# Patient Record
Sex: FEMALE | Race: BLACK OR AFRICAN AMERICAN
[De-identification: names, ages, dates, MRNs, and addresses within clinical notes are randomized per-mention and may not be internally consistent; named-entity substitution may affect disease eponyms.]

---

## 2017-06-16 ENCOUNTER — HOSPITAL ENCOUNTER (INPATIENT)
Dept: HOSPITAL 74 - YASAS | Age: 62
LOS: 4 days | Discharge: OTHER INSTITUTION | DRG: 897 | End: 2017-06-20
Attending: INTERNAL MEDICINE | Admitting: INTERNAL MEDICINE
Payer: COMMERCIAL

## 2017-06-16 VITALS — BODY MASS INDEX: 14.6 KG/M2

## 2017-06-16 DIAGNOSIS — Z90.49: ICD-10-CM

## 2017-06-16 DIAGNOSIS — J42: ICD-10-CM

## 2017-06-16 DIAGNOSIS — R26.2: ICD-10-CM

## 2017-06-16 DIAGNOSIS — F17.210: ICD-10-CM

## 2017-06-16 DIAGNOSIS — J45.22: ICD-10-CM

## 2017-06-16 DIAGNOSIS — R64: ICD-10-CM

## 2017-06-16 DIAGNOSIS — R00.0: ICD-10-CM

## 2017-06-16 DIAGNOSIS — M14.60: ICD-10-CM

## 2017-06-16 DIAGNOSIS — F32.9: ICD-10-CM

## 2017-06-16 DIAGNOSIS — Z99.89: ICD-10-CM

## 2017-06-16 DIAGNOSIS — R74.0: ICD-10-CM

## 2017-06-16 DIAGNOSIS — K21.9: ICD-10-CM

## 2017-06-16 DIAGNOSIS — E87.6: ICD-10-CM

## 2017-06-16 DIAGNOSIS — Z87.898: ICD-10-CM

## 2017-06-16 DIAGNOSIS — F10.230: Primary | ICD-10-CM

## 2017-06-16 DIAGNOSIS — G40.909: ICD-10-CM

## 2017-06-16 LAB
APPEARANCE UR: CLEAR
BILIRUB UR STRIP.AUTO-MCNC: NEGATIVE MG/DL
COLOR UR: (no result)
KETONES UR QL STRIP: NEGATIVE
LEUKOCYTE ESTERASE UR QL STRIP.AUTO: NEGATIVE
NITRITE UR QL STRIP: NEGATIVE
PH UR: 5 [PH] (ref 5–8)
PROT UR QL STRIP: NEGATIVE
PROT UR QL STRIP: NEGATIVE
RBC # UR STRIP: NEGATIVE /UL
UROBILINOGEN UR STRIP-MCNC: NEGATIVE E.U./DL (ref 0.2–1)

## 2017-06-16 PROCEDURE — HZ2ZZZZ DETOXIFICATION SERVICES FOR SUBSTANCE ABUSE TREATMENT: ICD-10-PCS | Performed by: INTERNAL MEDICINE

## 2017-06-16 RX ADMIN — GABAPENTIN SCH MG: 100 CAPSULE ORAL at 21:28

## 2017-06-16 RX ADMIN — LEVETIRACETAM SCH MG: 500 TABLET, FILM COATED ORAL at 21:28

## 2017-06-16 RX ADMIN — BUDESONIDE AND FORMOTEROL FUMARATE DIHYDRATE SCH: 80; 4.5 AEROSOL RESPIRATORY (INHALATION) at 21:29

## 2017-06-16 RX ADMIN — Medication SCH MG: at 21:29

## 2017-06-16 NOTE — HP
CIWA Score





- CIWA Score


Nausea/Vomitin


Muscle Tremors: 5


Anxiety: 4-Mod. Anxious/Guarded


Agitation: 4-Moderately Restless


Paroxysmal Sweats: 1-Minimal Palms Moist


Orientation: 1-Uncertain about Date


Tacttile Disturbances: 0-None


Auditory Disturbances: 0-None


Visual Disturbances: 0-None


Headache: 1-Very Mild


CIWA-Ar Total Score: 18





Admission ROS [facility]





- HPI


Chief Complaint: 


WITHDRAWAL SX


Allergies/Adverse Reactions: 


 Allergies











Allergy/AdvReac Type Severity Reaction Status Date / Time


 


Penicillins Allergy Severe Hives Verified 16 15:43











History of Present Illness: 


61 YEARS OLD FEMALE WITH LONG HISTORY OF ALCOHOL NICOTINE DEPENDENCE, HAS GERD, 

SEIZURE NEUROPATHY COPE AMBULATE WITH CANE, ALLERGIC SEASONAL AND DEPRESSION IS 

ADMITTED TO DETOX


Exam Limitations: No Limitations





- Ebola screening


Have you traveled outside of the country in the last 21 days: No


Have you had contact with anyone from an Ebola affected area: No


Have you been sick,other than usual withdrawal symptoms: No


Do you have a fever: No





- Review of Systems


Constitutional: Chills, Loss of Appetite, Changes in sleep, Unintentional Wgt. 

Loss, Unexplained wgt Loss


EENT: reports: Other (EYE GLASSES)


Respiratory: reports: SOB with Exertion, Productive cough (WHITISH)


Cardiac: reports: No Symptoms Reported


GI: reports: Nausea, Poor Appetite, Poor Fluid Intake, Vomiting, Indigestion, 

Abdominal cramping


: reports: No Symptoms Reported


Musculoskeletal: reports: Muscle Weakness (LEGS)


Integumentary: reports: No Symptoms Reported


Neuro: reports: Seizure, Tremors


Endocrine: reports: No Symptoms Reported


Hematology: reports: No Symptoms Reported


Psychiatric: reports: Judgement Intact, Depressed


Other Systems: Reviewed and Negative





Patient History





- Patient Medical History


Hx Anemia: Yes (long time ago, no treatment)


Hx Asthma: Yes (Pt is on MDI)


Hx Chronic Obstructive Pulmonary Disease (COPD): Yes


Hx Cancer: No


Hx Cardiac Disorders: No


Hx Congestive Heart Failure: No


Hx Hypertension: No


Hx Hypercholesterolemia: No


Hx Pacemaker: No


HX Cerebrovascular Accident: No


Hx Seizures: Yes (etoh related seizures last in )


Hx Dementia: No


Hx Diabetes: No


Hx Gastrointestinal Disorders: Yes (GERD)


Hx Liver Disease: No


Hx Genitourinary Disorders: No


Hx Sexually Transmitted Disorders: No


Hx Renal Disease (ESRD): No


Hx Thyroid Disease: No


Hx Human Immunodeficiency Virus (HIV): No (NEGATIVE HX)


Hx Hepatitis C: No


Hx Depression: Yes (ZOLOFT)


Hx Suicide Attempt: No


Hx Bipolar Disorder: No


Hx Schizophrenia: No





- Patient Surgical History


Past Surgical History: Yes


Hx Neurologic Surgery: No


Hx Cataract Extraction: No


Hx Cardiac Surgery: No


Hx Lung Surgery: No


Hx Breast Surgery: No


Hx Breast Biopsy: No


Hx Abdominal Surgery: No


Hx Appendectomy: Yes ()


Hx Cholecystectomy: Yes (long time ago)


Hx Genitourinary Surgery: No


Hx  Section: No


Hx Orthopedic Surgery: Yes (Right big toe x )


Hx Hysterectomy: No


Other Surgical History: Gallbladder removed


Anesthesia Reaction: No





- PPD History


Previous Implant?: Yes


Documented Results: Negative w/proof


Implanted On Prior [facility] Admission?: Yes


Date: 16


Results: 0 mm


PPD to be Administered?: No





- Reproductive History


Patient is a Female of Child Bearing Age (11 -55 yrs old): No


Last Menstrual Period: 10/17/00


Patient Pregnant: No





- Smoking Cessation


Smoking history: Current every day smoker


Have you smoked in the past 12 months: Yes


Aproximately how many cigarettes per day: 20


Cigars Per Day: 0


Hx Chewing Tobacco Use: No


Initiated information on smoking cessation: Yes


'Breaking Loose' booklet given: 17





- Substance & Tx. History


Hx Alcohol Use: Yes


Hx Substance Use: No


Substance Use Type: Alcohol


Hx Substance Use Treatment: Yes (-16 [place])





- Substances Abused


  ** Alcohol


Route: Oral


Frequency: Daily


Amount used: [id number]


Age of first use: 12


Date of Last Use: 17





Family Disease History





- Family Disease History


Family Disease History: Diabetes: Father, Sister, [place]: Mother (brain /alzheimers 

)





Admission Physical Exam [facility]





- Vital Signs


Vital Signs: 


 Vital Signs - 24 hr











  17





  15:55


 


Temperature 97.1 F L


 


Pulse Rate 98 H


 


Respiratory 16





Rate 


 


Blood Pressure 130/70














- Physical


General Appearance: Yes: Appropriately Dressed, Moderate Distress, Alcohol on 

Breath, Thin, Tremorous, Irritable, Sweating, Anxious


HEENTM: Yes: Hearing grossly Normal, Normal ENT Inspection, Normocephalic, 

Normal Voice


Respiratory: Yes: Chest Non-Tender, No Respiratory Distress, No Accessory 

Muscle Use, Hyperresonant, Inspiration


Neck: Yes: Supple, Trachea in good position


Breast: Yes: Breasts Symetrical


Cardiology: Yes: Regular Rhythm, S1, S2, Tachycardia


Abdominal: Yes: Non Tender, Soft


Genitourinary: Yes: Within Normal Limits


Back: Yes: Normal Inspection


Musculoskeletal: Yes: Gait Steady (CANE), Muscle weakness (LEGS)


Extremities: Yes: Non-Tender, Tremors, Other (WEAKNESS OF BOTH LEGS)


Neurological: Yes: Alert, Normal Response, Depressed Affect


Integumentary: Yes: Warm


Lymphatic: Yes: Within Normal Limits





- Diagnostic


(1) Alcohol dependence with uncomplicated withdrawal


Current Visit: Yes   Status: Acute





(2) COPD (chronic obstructive pulmonary disease)


Current Visit: Yes   Status: Chronic   Qualifiers: 


     COPD type: chronic bronchitis     Chronic bronchitis type: unspecified     

Qualified Code(s): J42 - Unspecified chronic bronchitis  


Comment: .








(3) GERD (gastroesophageal reflux disease)


Current Visit: Yes   Status: Chronic   Qualifiers: 


     Esophagitis presence: esophagitis presence not specified     Qualified Code

(s): K21.9 - Gastro-esophageal reflux disease without esophagitis  


Comment: .








(4) Nicotine dependence


Current Visit: Yes   Status: Acute   Qualifiers: 


     Nicotine product type: cigarettes     Substance use status: in withdrawal 

       Qualified Code(s): F17.213 - Nicotine dependence, cigarettes, with 

withdrawal  


Comment: .








(5) Seizures


Current Visit: Yes   Status: Chronic   Qualifiers: 


     Convulsion type: unspecified     Qualified Code(s): R56.9 - Unspecified 

convulsions  


Comment: .








(6) Neuropathic arthritis


Current Visit: Yes   Status: Chronic





(7) Weight loss


Current Visit: Yes   Status: Acute





(8) Depression


Current Visit: Yes   Status: Suspected   Qualifiers: 


     Depression Type: dysthymia        Qualified Code(s): F34.1 - Dysthymic 

disorder  





(9) Use of cane as ambulatory aid


Current Visit: Yes   Status: Chronic








Cleared for Admission S





- Detox or Rehab


[facility] Level of Care: Medically Managed


Detox Regimen/Protocol: Librium





BHS Breath Alcohol Content


Breath Alcohol Content: 0.204





Urine Pregancy Test





- Result


Urine Pregnancy Test Results: Negative- NO Line Present





Urine Drug Screen





- Results


Drug Screen Negative: Yes

## 2017-06-17 LAB
ALBUMIN SERPL-MCNC: 4.2 G/DL (ref 3.4–5)
ALP SERPL-CCNC: 88 U/L (ref 45–117)
ALT SERPL-CCNC: 45 U/L (ref 12–78)
ANION GAP SERPL CALC-SCNC: 17 MMOL/L (ref 8–16)
AST SERPL-CCNC: 108 U/L (ref 15–37)
BILIRUB SERPL-MCNC: 0.4 MG/DL (ref 0.2–1)
CALCIUM SERPL-MCNC: 9.2 MG/DL (ref 8.5–10.1)
CO2 SERPL-SCNC: 22 MMOL/L (ref 21–32)
CREAT SERPL-MCNC: 0.6 MG/DL (ref 0.55–1.02)
DEPRECATED RDW RBC AUTO: 13.4 % (ref 11.6–15.6)
GLUCOSE SERPL-MCNC: 86 MG/DL (ref 74–106)
MCH RBC QN AUTO: 31.6 PG (ref 25.7–33.7)
MCHC RBC AUTO-ENTMCNC: 33.9 G/DL (ref 32–36)
MCV RBC: 93.3 FL (ref 80–96)
PLATELET # BLD AUTO: 197 K/MM3 (ref 134–434)
PMV BLD: 9.3 FL (ref 7.5–11.1)
PROT SERPL-MCNC: 8.5 G/DL (ref 6.4–8.2)
SP GR UR: < 1.005 (ref 1–1.02)
WBC # BLD AUTO: 4.5 K/MM3 (ref 4–10)

## 2017-06-17 RX ADMIN — Medication SCH MG: at 22:58

## 2017-06-17 RX ADMIN — BUDESONIDE AND FORMOTEROL FUMARATE DIHYDRATE SCH INH: 80; 4.5 AEROSOL RESPIRATORY (INHALATION) at 10:51

## 2017-06-17 RX ADMIN — LEVETIRACETAM SCH MG: 500 TABLET, FILM COATED ORAL at 10:52

## 2017-06-17 RX ADMIN — GABAPENTIN SCH MG: 100 CAPSULE ORAL at 06:46

## 2017-06-17 RX ADMIN — MONTELUKAST SODIUM SCH MG: 10 TABLET, COATED ORAL at 22:58

## 2017-06-17 RX ADMIN — NICOTINE SCH: 21 PATCH TRANSDERMAL at 10:53

## 2017-06-17 RX ADMIN — PANTOPRAZOLE SODIUM SCH MG: 20 TABLET, DELAYED RELEASE ORAL at 10:51

## 2017-06-17 RX ADMIN — Medication SCH TAB: at 10:52

## 2017-06-17 RX ADMIN — POTASSIUM CHLORIDE SCH MEQ: 1500 TABLET, EXTENDED RELEASE ORAL at 22:57

## 2017-06-17 RX ADMIN — GABAPENTIN SCH MG: 100 CAPSULE ORAL at 14:30

## 2017-06-17 RX ADMIN — GABAPENTIN SCH: 100 CAPSULE ORAL at 22:58

## 2017-06-17 RX ADMIN — BUDESONIDE AND FORMOTEROL FUMARATE DIHYDRATE SCH: 80; 4.5 AEROSOL RESPIRATORY (INHALATION) at 22:58

## 2017-06-17 RX ADMIN — GUAIFENESIN AND DEXTROMETHORPHAN PRN ML: 100; 10 SYRUP ORAL at 06:50

## 2017-06-17 RX ADMIN — LEVETIRACETAM SCH MG: 500 TABLET, FILM COATED ORAL at 22:57

## 2017-06-17 NOTE — EKG
Test Reason : 

Blood Pressure : ***/*** mmHG

Vent. Rate : 091 BPM     Atrial Rate : 091 BPM

   P-R Int : 170 ms          QRS Dur : 090 ms

    QT Int : 388 ms       P-R-T Axes : 072 058 073 degrees

   QTc Int : 477 ms

 

NORMAL SINUS RHYTHM

POSSIBLE LEFT ATRIAL ENLARGEMENT

SEPTAL INFARCT , AGE UNDETERMINED

ABNORMAL ECG

NO PREVIOUS ECGS AVAILABLE

Confirmed by CARLOS CANAS MD ([id number]) on 6/17/2017 9:58:25 AM

 

Referred By: Mehdi Eric           Confirmed By:CARLOS CANAS MD

## 2017-06-17 NOTE — CONSULT
[facility] Psychiatric Consult





- Data


Date of interview: 06/17/17


Admission source: [facility]


Identifying data: Readmission to [facility] for this 60 y/o AA female seeking 

detox treatment for alcohol dependence.Patient is a ,mother of two,

domiciled,unemployed and supported on SSI and 's benefits..


Substance Abuse History: - Smoking Cessation.  Smoking history: Current every 

day smoker.  Have you smoked in the past 12 months: Yes.  Aproximately how many 

cigarettes per day: 20.  Cigars Per Day: 0.  Hx Chewing Tobacco Use: No.  

Initiated information on smoking cessation: Yes.  'Breaking Loose' booklet given

: 06/16/17.  - Substance & Tx. History.  Hx Alcohol Use: Yes.  Hx Substance Use

: No.  Substance Use Type: Alcohol.  Hx Substance Use Treatment: Yes (11/17-11/ 21/16 [place]).  - Substances Abused.  ** Alcohol.  Route: Oral.  Frequency: 

Daily.  Amount used: [id number].  Age of first use: 12.  Date of Last Use: 06/16 /17.  Confirmed by patient.


Medical History: Significant for weight loss,anemia,COPD,bronchial asthma,

spinal stenosis,low back pain,GERD,seizure disorder (on keppra) and a history 

of cholecystectomy.Noted history of orthosurgery for fracture of right big toe.


Psychiatric History: Patient denies history of psychiatric hospitalizations.No 

OPD care.Ms Song is currently prescribed zoloft 25 mg/day by her primary care 

doctor (started this month).Patient admits to experiencing periods of dyphoric/

depressed mood but she denies suicidal ideation.No history of suicide 

attempts.Reported decent sleep.


Physical/Sexual Abuse/Trauma History: Patient denies history of abuse.


Additional Comment: Drug Screen is negative.





Mental Status Exam





- Mental Status Exam


Alert and Oriented to: Time, Place, Person


Cognitive Function: Good


Patient Appearance: Well Groomed (thin,frail habitus,short stature)


Mood: Sad, Withdrawn


Affect: Constricted


Patient Behavior: Fatigued, Cooperative


Speech Pattern: Clear


Voice Loudness: Moderately Soft/Quiet


Thought Process: Intact, Goal Oriented


Thought Disorder: Not Present


Hallucinations: Denies


Suicidal Ideation: Denies


Homicidal Ideation: Denies


Insight/Judgement: Poor


Sleep: Poorly, Difficulty falling asleep


Appetite: Weight loss


Muscle strength/Tone: Normal


Gait/Station: Other (walks with a cane)





Psychiatric Findings





- Problem List (Axis 1, 2,3)


(1) Alcohol dependence with uncomplicated withdrawal


Current Visit: Yes   Status: Acute





(2) Nicotine dependence


Current Visit: Yes   Status: Acute   Qualifiers: 


     Nicotine product type: cigarettes     Substance use status: in withdrawal 

       Qualified Code(s): F17.213 - Nicotine dependence, cigarettes, with 

withdrawal  


Comment: .








(3) Depressive disorder


Current Visit: Yes   Status: Chronic





(4) Weight loss


Current Visit: Yes   Status: Chronic





(5) COPD (chronic obstructive pulmonary disease)


Current Visit: Yes   Status: Chronic   Qualifiers: 


     COPD type: chronic bronchitis     Chronic bronchitis type: unspecified     

Qualified Code(s): J42 - Unspecified chronic bronchitis  


Comment: .








(6) GERD (gastroesophageal reflux disease)


Current Visit: Yes   Status: Chronic   Qualifiers: 


     Esophagitis presence: esophagitis presence not specified        Qualified 

Code(s): K21.9 - Gastro-esophageal reflux disease without esophagitis  


Comment: .








(7) Neuropathic arthritis


Current Visit: Yes   Status: Chronic





(8) Seizures


Current Visit: Yes   Status: Chronic   Qualifiers: 


     Convulsion type: unspecified     Qualified Code(s): R56.9 - Unspecified 

convulsions  


Comment: .








(9) Anemia


Current Visit: Yes   Status: Chronic   


Comment: .








(10) Asthma


Current Visit: No   Status: Chronic   Qualifiers: 


     Asthma severity: mild intermittent     Asthma complication type: with 

status asthmaticus        Qualified Code(s): J45.22 - Mild intermittent asthma 

with status asthmaticus  


Comment: .








(11) Cachexia


Current Visit: No   Status: Chronic








- Initial Treatment Plan


Initial Treatment Plan: Psychoeducation.Detoxification.Zoloft 25 mg po 

daily.Side effects/benefits discussed with the patient.She agrees with this 

careplan.Observation.NO scripts at discharge (issued on 6/4/17 at [facility]).

## 2017-06-17 NOTE — PN
S CIWA





- CIWA Score


Nausea/Vomitin-No Nausea/No Vomiting


Muscle Tremors: 4-Moderate,w/Arms Extend


Anxiety: 4-Mod. Anxious/Guarded


Agitation: 4-Moderately Restless


Paroxysmal Sweats: 3


Orientation: 0-Oriented


Tacttile Disturbances: 0-None


Auditory Disturbances: 0-None


Visual Disturbances: 0-None


Headache: 0-None Present


CIWA-Ar Total Score: 15





BHS Progress Note (SOAP)


Subjective: 


Anxiety,tremors,sweating,interrupted sleep,restless.


Objective: 





17 15:46


 Vital Signs - 8 hr











  17





  10:00 15:26


 


Temperature 97.9 F 97.9 F


 


Pulse Rate 90 96 H


 


Respiratory 20 18





Rate  


 


Blood Pressure 129/73 134/77








 Laboratory Tests











  17





  21:16 08:00 08:00


 


WBC   4.5 


 


RBC   3.99 


 


Hgb   12.6 


 


Hct   37.2 


 


MCV   93.3 


 


MCHC   33.9 


 


RDW   13.4 


 


Plt Count   197  D 


 


MPV   9.3 


 


Sodium    133 L


 


Potassium    3.3 L


 


Chloride    94 L


 


Carbon Dioxide    22


 


Anion Gap    17 H


 


BUN    4 L D


 


Creatinine    0.6


 


Creat Clearance w eGFR    > 60


 


Random Glucose    86


 


Calcium    9.2


 


Total Bilirubin    0.4  D


 


AST    108 H


 


ALT    45  D


 


Alkaline Phosphatase    88


 


Total Protein    8.5 H


 


Albumin    4.2


 


Urine Color  Straw  


 


Urine Appearance  Clear  


 


Urine pH  5.0  


 


Ur Specific Gravity  < 1.005 L  


 


Urine Protein  Negative  


 


Urine Glucose (UA)  Negative  


 


Urine Ketones  Negative  


 


Urine Blood  Negative  


 


Urine Nitrite  Negative  


 


Urine Bilirubin  Negative  


 


Urine Urobilinogen  Negative  


 


Ur Leukocyte Esterase  Negative  








labs noted,K+ 3.3,k-dur ordered 


Assessment: 





17 15:47


Withdrawal sx.


Plan: 


Continue detox

## 2017-06-18 RX ADMIN — BUDESONIDE AND FORMOTEROL FUMARATE DIHYDRATE SCH INH: 80; 4.5 AEROSOL RESPIRATORY (INHALATION) at 10:40

## 2017-06-18 RX ADMIN — MONTELUKAST SODIUM SCH MG: 10 TABLET, COATED ORAL at 22:46

## 2017-06-18 RX ADMIN — Medication SCH MG: at 22:45

## 2017-06-18 RX ADMIN — POTASSIUM CHLORIDE SCH MEQ: 1500 TABLET, EXTENDED RELEASE ORAL at 10:39

## 2017-06-18 RX ADMIN — LEVETIRACETAM SCH MG: 500 TABLET, FILM COATED ORAL at 22:45

## 2017-06-18 RX ADMIN — BUDESONIDE AND FORMOTEROL FUMARATE DIHYDRATE SCH: 80; 4.5 AEROSOL RESPIRATORY (INHALATION) at 23:07

## 2017-06-18 RX ADMIN — NICOTINE SCH: 21 PATCH TRANSDERMAL at 10:39

## 2017-06-18 RX ADMIN — PANTOPRAZOLE SODIUM SCH MG: 20 TABLET, DELAYED RELEASE ORAL at 10:39

## 2017-06-18 RX ADMIN — Medication SCH TAB: at 10:39

## 2017-06-18 RX ADMIN — GABAPENTIN SCH MG: 100 CAPSULE ORAL at 06:36

## 2017-06-18 RX ADMIN — GUAIFENESIN AND DEXTROMETHORPHAN PRN ML: 100; 10 SYRUP ORAL at 23:16

## 2017-06-18 RX ADMIN — LEVETIRACETAM SCH MG: 500 TABLET, FILM COATED ORAL at 10:39

## 2017-06-18 RX ADMIN — GABAPENTIN SCH: 100 CAPSULE ORAL at 22:46

## 2017-06-18 RX ADMIN — POTASSIUM CHLORIDE SCH MEQ: 1500 TABLET, EXTENDED RELEASE ORAL at 22:46

## 2017-06-18 RX ADMIN — GABAPENTIN SCH: 100 CAPSULE ORAL at 13:30

## 2017-06-18 NOTE — PN
[facility] CIWA





- CIWA Score


Nausea/Vomitin-Mild Nausea/No Vomiting


Muscle Tremors: 5


Anxiety: 4-Mod. Anxious/Guarded


Agitation: 4-Moderately Restless


Paroxysmal Sweats: 3


Orientation: 0-Oriented


Tacttile Disturbances: 1-Very Mild Itch/Numbness


Auditory Disturbances: 0-None


Visual Disturbances: 0-None


Headache: 0-None Present


CIWA-Ar Total Score: 18





BHS Progress Note (SOAP)


Subjective: 


Anxiety,tremors,sweating,interrupted sleep,restless


Objective: 





17 10:18


 Vital Signs - 8 hr











  17





  03:30 06:52


 


Temperature  97.9 F


 


Pulse Rate  83


 


Respiratory 18 18





Rate  


 


Blood Pressure  141/86








 Laboratory Last Values











WBC  4.5 K/mm3 (4.0-10.0)   17  08:00    


 


RBC  3.99 M/mm3 (3.60-5.2)   17  08:00    


 


Hgb  12.6 GM/dL (10.7-15.3)   17  08:00    


 


Hct  37.2 % (32.4-45.2)   17  08:00    


 


MCV  93.3 fl (80-96)   17  08:00    


 


MCHC  33.9 g/dl (32.0-36.0)   17  08:00    


 


RDW  13.4 % (11.6-15.6)   17  08:00    


 


Plt Count  197 K/MM3 (134-434)  D 17  08:00    


 


MPV  9.3 fl (7.5-11.1)   17  08:00    


 


Sodium  133 mmol/L (136-145)  L  17  08:00    


 


Potassium  3.3 mmol/L (3.5-5.1)  L  17  08:00    


 


Chloride  94 mmol/L ()  L  17  08:00    


 


Carbon Dioxide  22 mmol/L (21-32)   17  08:00    


 


Anion Gap  17  (8-16)  H  17  08:00    


 


BUN  4 mg/dL (7-18)  L D 17  08:00    


 


Creatinine  0.6 mg/dL (0.55-1.02)   17  08:00    


 


Creat Clearance w eGFR  > 60  (>60)   17  08:00    


 


Random Glucose  86 mg/dL ()   17  08:00    


 


Calcium  9.2 mg/dL (8.5-10.1)   17  08:00    


 


Total Bilirubin  0.4 mg/dL (0.2-1.0)  D 17  08:00    


 


AST  108 U/L (15-37)  H  17  08:00    


 


ALT  45 U/L (12-78)  D 17  08:00    


 


Alkaline Phosphatase  88 U/L ()   17  08:00    


 


Total Protein  8.5 g/dl (6.4-8.2)  H  17  08:00    


 


Albumin  4.2 g/dl (3.4-5.0)   17  08:00    


 


Urine Color  Straw   17  21:16    


 


Urine Appearance  Clear   17  21:16    


 


Urine pH  5.0  (5.0-8.0)   17  21:16    


 


Ur Specific Gravity  < 1.005  (1.005-1.025)  L  17  21:16    


 


Urine Protein  Negative  (NEGATIVE)   17  21:16    


 


Urine Glucose (UA)  Negative  (NEGATIVE)   17  21:16    


 


Urine Ketones  Negative  (NEGATIVE)   17  21:16    


 


Urine Blood  Negative  (NEGATIVE)   17  21:16    


 


Urine Nitrite  Negative  (NEGATIVE)   17  21:16    


 


Urine Bilirubin  Negative  (NEGATIVE)   17  21:16    


 


Urine Urobilinogen  Negative E.U./dl (0.2-1.0)   17  21:16    


 


Ur Leukocyte Esterase  Negative  (NEGATIVE)   17  21:16    








labs noted


Assessment: 





17 10:21


Withdrawal sx.


Plan: 


continue detox

## 2017-06-19 RX ADMIN — LEVETIRACETAM SCH MG: 500 TABLET, FILM COATED ORAL at 10:42

## 2017-06-19 RX ADMIN — BUDESONIDE AND FORMOTEROL FUMARATE DIHYDRATE SCH: 80; 4.5 AEROSOL RESPIRATORY (INHALATION) at 22:43

## 2017-06-19 RX ADMIN — GABAPENTIN SCH MG: 100 CAPSULE ORAL at 14:45

## 2017-06-19 RX ADMIN — NICOTINE SCH: 21 PATCH TRANSDERMAL at 10:43

## 2017-06-19 RX ADMIN — Medication SCH MG: at 22:44

## 2017-06-19 RX ADMIN — GUAIFENESIN AND DEXTROMETHORPHAN PRN ML: 100; 10 SYRUP ORAL at 05:31

## 2017-06-19 RX ADMIN — Medication SCH TAB: at 10:42

## 2017-06-19 RX ADMIN — PANTOPRAZOLE SODIUM SCH MG: 20 TABLET, DELAYED RELEASE ORAL at 10:42

## 2017-06-19 RX ADMIN — GABAPENTIN SCH: 100 CAPSULE ORAL at 22:43

## 2017-06-19 RX ADMIN — MONTELUKAST SODIUM SCH MG: 10 TABLET, COATED ORAL at 22:43

## 2017-06-19 RX ADMIN — POTASSIUM CHLORIDE SCH MEQ: 1500 TABLET, EXTENDED RELEASE ORAL at 10:42

## 2017-06-19 RX ADMIN — BUDESONIDE AND FORMOTEROL FUMARATE DIHYDRATE SCH INH: 80; 4.5 AEROSOL RESPIRATORY (INHALATION) at 10:43

## 2017-06-19 RX ADMIN — POTASSIUM CHLORIDE SCH MEQ: 1500 TABLET, EXTENDED RELEASE ORAL at 22:42

## 2017-06-19 RX ADMIN — LEVETIRACETAM SCH MG: 500 TABLET, FILM COATED ORAL at 22:42

## 2017-06-19 RX ADMIN — GABAPENTIN SCH: 100 CAPSULE ORAL at 07:45

## 2017-06-19 NOTE — PN
BHS Progress Note (SOAP)


Subjective: 


interrupted sleep, sweats, shakes 


Objective: 





06/19/17 11:04


 Last Vital Signs











Temp Pulse Resp BP Pulse Ox


 


 98.2 F   85   16   114/60    


 


 06/19/17 06:00  06/19/17 06:00  06/19/17 06:00  06/19/17 06:00   








 Laboratory Tests











  06/16/17 06/17/17 06/17/17





  21:16 08:00 08:00


 


WBC   4.5 


 


RBC   3.99 


 


Hgb   12.6 


 


Hct   37.2 


 


MCV   93.3 


 


MCHC   33.9 


 


RDW   13.4 


 


Plt Count   197  D 


 


MPV   9.3 


 


Sodium    133 L


 


Potassium    3.3 L


 


Chloride    94 L


 


Carbon Dioxide    22


 


Anion Gap    17 H


 


BUN    4 L D


 


Creatinine    0.6


 


Creat Clearance w eGFR    > 60


 


Random Glucose    86


 


Calcium    9.2


 


Total Bilirubin    0.4  D


 


AST    108 H


 


ALT    45  D


 


Alkaline Phosphatase    88


 


Total Protein    8.5 H


 


Albumin    4.2


 


Urine Color  Straw  


 


Urine Appearance  Clear  


 


Urine pH  5.0  


 


Ur Specific Gravity  < 1.005 L  


 


Urine Protein  Negative  


 


Urine Glucose (UA)  Negative  


 


Urine Ketones  Negative  


 


Urine Blood  Negative  


 


Urine Nitrite  Negative  


 


Urine Bilirubin  Negative  


 


Urine Urobilinogen  Negative  


 


Ur Leukocyte Esterase  Negative  


 


RPR Titer   














  06/17/17





  08:00


 


WBC 


 


RBC 


 


Hgb 


 


Hct 


 


MCV 


 


MCHC 


 


RDW 


 


Plt Count 


 


MPV 


 


Sodium 


 


Potassium 


 


Chloride 


 


Carbon Dioxide 


 


Anion Gap 


 


BUN 


 


Creatinine 


 


Creat Clearance w eGFR 


 


Random Glucose 


 


Calcium 


 


Total Bilirubin 


 


AST 


 


ALT 


 


Alkaline Phosphatase 


 


Total Protein 


 


Albumin 


 


Urine Color 


 


Urine Appearance 


 


Urine pH 


 


Ur Specific Gravity 


 


Urine Protein 


 


Urine Glucose (UA) 


 


Urine Ketones 


 


Urine Blood 


 


Urine Nitrite 


 


Urine Bilirubin 


 


Urine Urobilinogen 


 


Ur Leukocyte Esterase 


 


RPR Titer  Nonreactive








pt aox3 in nad ambualting not using cane at present , very thin 


Assessment: 


06/19/17 11:05


withdrawal sx;s 


elevated transaminases 


hypokalemia 








06/19/17 11:06





Plan: 


cont. detox 


increase fluids 


ensure tid 


d/c in am

## 2017-06-20 ENCOUNTER — HOSPITAL ENCOUNTER (INPATIENT)
Dept: HOSPITAL 74 - YASAS | Age: 62
LOS: 21 days | Discharge: HOME | DRG: 895 | End: 2017-07-11
Attending: PSYCHIATRY & NEUROLOGY | Admitting: PSYCHIATRY & NEUROLOGY
Payer: COMMERCIAL

## 2017-06-20 VITALS — SYSTOLIC BLOOD PRESSURE: 128 MMHG | HEART RATE: 91 BPM | TEMPERATURE: 97.9 F | DIASTOLIC BLOOD PRESSURE: 80 MMHG

## 2017-06-20 DIAGNOSIS — M14.60: ICD-10-CM

## 2017-06-20 DIAGNOSIS — J44.9: ICD-10-CM

## 2017-06-20 DIAGNOSIS — D64.9: ICD-10-CM

## 2017-06-20 DIAGNOSIS — F10.24: ICD-10-CM

## 2017-06-20 DIAGNOSIS — R64: ICD-10-CM

## 2017-06-20 DIAGNOSIS — F10.282: ICD-10-CM

## 2017-06-20 DIAGNOSIS — F10.20: Primary | ICD-10-CM

## 2017-06-20 RX ADMIN — LEVETIRACETAM SCH MG: 500 TABLET, FILM COATED ORAL at 21:35

## 2017-06-20 RX ADMIN — NICOTINE SCH: 21 PATCH TRANSDERMAL at 10:36

## 2017-06-20 RX ADMIN — ALUMINUM HYDROXIDE, MAGNESIUM HYDROXIDE, AND SIMETHICONE PRN ML: 200; 200; 20 SUSPENSION ORAL at 16:50

## 2017-06-20 RX ADMIN — GABAPENTIN SCH MG: 100 CAPSULE ORAL at 07:00

## 2017-06-20 RX ADMIN — LEVETIRACETAM SCH MG: 500 TABLET, FILM COATED ORAL at 10:36

## 2017-06-20 RX ADMIN — Medication SCH MG: at 21:36

## 2017-06-20 RX ADMIN — BUDESONIDE AND FORMOTEROL FUMARATE DIHYDRATE SCH INH: 80; 4.5 AEROSOL RESPIRATORY (INHALATION) at 10:37

## 2017-06-20 RX ADMIN — POTASSIUM CHLORIDE SCH MEQ: 1500 TABLET, EXTENDED RELEASE ORAL at 10:36

## 2017-06-20 RX ADMIN — MONTELUKAST SODIUM SCH MG: 10 TABLET, COATED ORAL at 21:36

## 2017-06-20 RX ADMIN — Medication SCH TAB: at 10:36

## 2017-06-20 RX ADMIN — BUDESONIDE AND FORMOTEROL FUMARATE DIHYDRATE SCH INHALER: 80; 4.5 AEROSOL RESPIRATORY (INHALATION) at 21:35

## 2017-06-20 NOTE — HP
[facility] MD Rehab Assess/Revision





- Admission History


Admitted to Rehab from: [facility]


Date of Admission to Rehab: 6/20/17





- Vital signs


Vital Signs: 


 Vital Signs











 Period  Temp  Pulse  Resp  BP Sys/Neal  Pulse Ox


 


 Last 24 Hr  97.8 F  84  16  146/80  














- Findings


Detox History & Physical reviewed: Yes


Concur with findings: Yes

## 2017-06-20 NOTE — DS
[facility] Detox Discharge Summary


Admission Date: 


06/16/17





Discharge Date: 06/20/17





- History


Present History: Alcohol Dependence





- Physical Exam Results


Vital Signs: 


 Vital Signs











Temperature  97.5 F L  06/20/17 06:30


 


Pulse Rate  89   06/20/17 06:30


 


Respiratory Rate  16   06/20/17 06:30


 


Blood Pressure  116/73   06/20/17 06:30


 


O2 Sat by Pulse Oximetry (%)      














- Treatment


Hospital Course: Detox Protocol Followed, Detoxed Safely, Responded well, 

Discharged Condition Good





- Medication


Discharge Medications: 


Ambulatory Orders





Multivitamins [Multivit ([facility] Formulary)] 1 tab PO DAILY 04/22/15 


Albuterol Sulfate Inhaler - [Ventolin HFA Inhaler -] 2 inh PO Q4H PRN #1 

canister 02/26/16 


Budesonide/Formeterol Fumarate [SYMBICORT 80/4.5mcg -] 1 inh PO BID #1 canister 

02/26/16 


Folic Acid - 1 mg PO DAILY #30 tablet 02/26/16 


Montelukast Na [Singulair -] 10 mg PO HS #30 tablet 02/26/16 


Thiamine HCl [Vitamin B1 -] 100 mg PO HS #30 tablet 02/26/16 


Levetiracetam [Keppra -] 750 mg PO DAILY 11/17/16 


Omeprazole 20 mg PO DAILY 11/17/16 


Albuterol Sulfate Inhaler - [Ventolin HFA Inhaler -] 2 puff IH Q4H PRN #1 

inhaler 06/20/17 


Budesonide/Formeterol Fumarate [SYMBICORT 80/4.5mcg -] 2 puff IH BID #1 inhaler 

06/20/17 


Gabapentin [Neurontin -] 200 mg PO TID #90 mg 06/20/17 


Levetiracetam [Keppra -] 1,000 mg PO BID #60 tablet 06/20/17 


Montelukast Na [Singulair -] 10 mg PO HS #30 tablet 06/20/17 


Pantoprazole Sodium [Protonix -] 40 mg PO DAILY #30 mg 06/20/17 











- Diagnosis


(1) Alcohol dependence with uncomplicated withdrawal


Current Visit: Yes   Status: Chronic





(2) Nicotine dependence


Current Visit: Yes   Status: Chronic   Qualifiers: 


     Nicotine product type: cigarettes     Substance use status: in withdrawal 

       Qualified Code(s): F17.213 - Nicotine dependence, cigarettes, with 

withdrawal  





(3) Anemia


Current Visit: Yes   Status: Chronic   





(4) COPD (chronic obstructive pulmonary disease)


Current Visit: Yes   Status: Chronic   Qualifiers: 


     COPD type: chronic bronchitis     Chronic bronchitis type: unspecified     

Qualified Code(s): J42 - Unspecified chronic bronchitis  





(5) Depressive disorder


Current Visit: Yes   Status: Chronic





(6) GERD (gastroesophageal reflux disease)


Current Visit: Yes   Status: Chronic   Qualifiers: 


     Esophagitis presence: esophagitis presence not specified        Qualified 

Code(s): K21.9 - Gastro-esophageal reflux disease without esophagitis  





(7) Neuropathic arthritis


Current Visit: Yes   Status: Acute








- AMA


Did Patient Leave Against Medical Advice: No

## 2017-06-21 RX ADMIN — BUDESONIDE AND FORMOTEROL FUMARATE DIHYDRATE SCH INHALER: 80; 4.5 AEROSOL RESPIRATORY (INHALATION) at 21:13

## 2017-06-21 RX ADMIN — PANTOPRAZOLE SODIUM SCH MG: 40 TABLET, DELAYED RELEASE ORAL at 09:34

## 2017-06-21 RX ADMIN — LEVETIRACETAM SCH MG: 500 TABLET, FILM COATED ORAL at 21:12

## 2017-06-21 RX ADMIN — LEVETIRACETAM SCH MG: 500 TABLET, FILM COATED ORAL at 09:34

## 2017-06-21 RX ADMIN — Medication SCH TAB: at 09:34

## 2017-06-21 RX ADMIN — SERTRALINE HYDROCHLORIDE SCH MG: 25 TABLET ORAL at 14:49

## 2017-06-21 RX ADMIN — GUAIFENESIN AND DEXTROMETHORPHAN PRN ML: 100; 10 SYRUP ORAL at 09:36

## 2017-06-21 RX ADMIN — BUDESONIDE AND FORMOTEROL FUMARATE DIHYDRATE SCH INHALER: 80; 4.5 AEROSOL RESPIRATORY (INHALATION) at 09:36

## 2017-06-21 RX ADMIN — MONTELUKAST SODIUM SCH MG: 10 TABLET, COATED ORAL at 21:12

## 2017-06-21 RX ADMIN — Medication SCH MG: at 21:12

## 2017-06-21 RX ADMIN — NICOTINE SCH: 14 PATCH, EXTENDED RELEASE TRANSDERMAL at 09:34

## 2017-06-21 NOTE — HP
Psychiatrist Admission





- Data


Date of interview: 06/21/17


Admission source: [address] [place] [address]


Identifying data: This is the second admission to [facility] for this 61 years old 

AA  mother of 2,domiciled,supported by [employer].


Medical History: Significant for Anemia,BA,COPD,Seizure disorder,Cahexia.


Psychiatric History: Patient denies history of psychiatric hospitalizations,

reports periods of dysphoric,depressed mood.No history of suicidality.No 

psychiatric follow up,care.According to the patient her Family Doctor 

prescribes Zoloft 25 mg po daily.Patient is willing to continue Zoloft 25 mg po 

daily at this time.


Physical/Sexual Abuse/Trauma History: denies


Vital Signs: 


 Vital Signs - 24 hr











  06/20/17 06/21/17 06/21/17





  12:55 01:21 03:30


 


Temperature 97.8 F  


 


Pulse Rate 84  


 


Respiratory 16 18 18





Rate   


 


Blood Pressure 146/80  














  06/21/17





  07:08


 


Temperature 98.0 F


 


Pulse Rate 89


 


Respiratory 18





Rate 


 


Blood Pressure 127/80











Allergies/Adverse Reactions: 


 Allergies











Allergy/AdvReac Type Severity Reaction Status Date / Time


 


Penicillins Allergy Severe Hives Verified 06/16/17 20:54











Date of last physical exam: 06/16/17


Concur with the findings of this exam: Yes





- Substance Abuse/Tx History


Hx Alcohol Use: Yes (reports drinking since 13 yo,4-5 22 oz of beer)


Hx Substance Use: No


Substance Use Type: Alcohol


Hx Substance Use Treatment: Yes (completed this program in 2015)





- Admission Criteria


Previous failed treatment: Yes


Poor recovery environment: Yes


Comorbidities: Yes


Lacks judgement: Yes





Mental Status Exam





- Mental Status Exam


Alert and Oriented to: Time, Place, Person


Cognitive Function: Grossly Intact


Patient Appearance: Well Groomed


Mood: Sad


Affect: Mood Congruent, Labile


Patient Behavior: Cooperative


Speech Pattern: Clear


Voice Loudness: Normal


Thought Process: Goal Oriented


Thought Disorder: Not Present


Hallucinations: Denies


Suicidal Ideation: Denies


Homicidal Ideation: Denies


Insight/Judgement: Fair


Sleep: Fair


Appetite: Fair, Weight loss


Muscle strength/Tone: Normal


Gait/Station: Normal





Psychiatric Findings





- Problem List (Axis 1, 2,3)


(1) Alcohol-induced sleep disorder


Current Visit: Yes   Status: Chronic





(2) Neuropathic arthritis


Current Visit: Yes   Status: Chronic





(3) Alcohol-induced mood disorder


Current Visit: Yes   Status: Chronic





(4) Anemia


Current Visit: Yes   Status: Chronic   


Comment: .








(5) Asthma


Current Visit: Yes   Status: Chronic   Qualifiers: 


   


Comment: .








(6) COPD (chronic obstructive pulmonary disease)


Current Visit: Yes   Status: Chronic   


Comment: .








(7) Cachexia


Current Visit: Yes   Status: Chronic








- Initial Treatment Plan


Initial Treatment Plan: Continue Zoloft 25 mg po daily.Will monitor progress.

## 2017-06-22 RX ADMIN — BUDESONIDE AND FORMOTEROL FUMARATE DIHYDRATE SCH INHALER: 80; 4.5 AEROSOL RESPIRATORY (INHALATION) at 21:12

## 2017-06-22 RX ADMIN — MONTELUKAST SODIUM SCH MG: 10 TABLET, COATED ORAL at 21:11

## 2017-06-22 RX ADMIN — LEVETIRACETAM SCH MG: 500 TABLET, FILM COATED ORAL at 09:51

## 2017-06-22 RX ADMIN — LEVETIRACETAM SCH MG: 500 TABLET, FILM COATED ORAL at 21:11

## 2017-06-22 RX ADMIN — PANTOPRAZOLE SODIUM SCH MG: 40 TABLET, DELAYED RELEASE ORAL at 09:51

## 2017-06-22 RX ADMIN — Medication SCH TAB: at 09:51

## 2017-06-22 RX ADMIN — BUDESONIDE AND FORMOTEROL FUMARATE DIHYDRATE SCH: 80; 4.5 AEROSOL RESPIRATORY (INHALATION) at 09:53

## 2017-06-22 RX ADMIN — Medication SCH MG: at 21:11

## 2017-06-22 RX ADMIN — NICOTINE SCH: 14 PATCH, EXTENDED RELEASE TRANSDERMAL at 09:51

## 2017-06-22 RX ADMIN — SERTRALINE HYDROCHLORIDE SCH MG: 25 TABLET ORAL at 09:52

## 2017-06-23 RX ADMIN — NICOTINE SCH: 14 PATCH, EXTENDED RELEASE TRANSDERMAL at 10:00

## 2017-06-23 RX ADMIN — GUAIFENESIN AND DEXTROMETHORPHAN PRN ML: 100; 10 SYRUP ORAL at 21:29

## 2017-06-23 RX ADMIN — LEVETIRACETAM SCH MG: 500 TABLET, FILM COATED ORAL at 10:00

## 2017-06-23 RX ADMIN — Medication SCH MG: at 21:34

## 2017-06-23 RX ADMIN — LEVETIRACETAM SCH MG: 500 TABLET, FILM COATED ORAL at 21:27

## 2017-06-23 RX ADMIN — PANTOPRAZOLE SODIUM SCH MG: 40 TABLET, DELAYED RELEASE ORAL at 10:00

## 2017-06-23 RX ADMIN — BUDESONIDE AND FORMOTEROL FUMARATE DIHYDRATE SCH INHALER: 80; 4.5 AEROSOL RESPIRATORY (INHALATION) at 21:26

## 2017-06-23 RX ADMIN — BUDESONIDE AND FORMOTEROL FUMARATE DIHYDRATE SCH INHALER: 80; 4.5 AEROSOL RESPIRATORY (INHALATION) at 09:59

## 2017-06-23 RX ADMIN — Medication SCH TAB: at 10:00

## 2017-06-23 RX ADMIN — MONTELUKAST SODIUM SCH MG: 10 TABLET, COATED ORAL at 21:27

## 2017-06-23 RX ADMIN — SERTRALINE HYDROCHLORIDE SCH MG: 25 TABLET ORAL at 10:00

## 2017-06-24 RX ADMIN — BUDESONIDE AND FORMOTEROL FUMARATE DIHYDRATE SCH INHALER: 80; 4.5 AEROSOL RESPIRATORY (INHALATION) at 21:16

## 2017-06-24 RX ADMIN — BUDESONIDE AND FORMOTEROL FUMARATE DIHYDRATE SCH INHALER: 80; 4.5 AEROSOL RESPIRATORY (INHALATION) at 09:36

## 2017-06-24 RX ADMIN — NICOTINE SCH: 14 PATCH, EXTENDED RELEASE TRANSDERMAL at 09:36

## 2017-06-24 RX ADMIN — MONTELUKAST SODIUM SCH MG: 10 TABLET, COATED ORAL at 21:15

## 2017-06-24 RX ADMIN — LEVETIRACETAM SCH MG: 500 TABLET, FILM COATED ORAL at 10:51

## 2017-06-24 RX ADMIN — SERTRALINE HYDROCHLORIDE SCH MG: 25 TABLET ORAL at 09:36

## 2017-06-24 RX ADMIN — PANTOPRAZOLE SODIUM SCH MG: 40 TABLET, DELAYED RELEASE ORAL at 09:35

## 2017-06-24 RX ADMIN — Medication SCH TAB: at 09:35

## 2017-06-24 RX ADMIN — GUAIFENESIN AND DEXTROMETHORPHAN PRN ML: 100; 10 SYRUP ORAL at 21:17

## 2017-06-24 RX ADMIN — LEVETIRACETAM SCH MG: 500 TABLET, FILM COATED ORAL at 21:15

## 2017-06-24 RX ADMIN — Medication SCH MG: at 21:17

## 2017-06-24 NOTE — PN
[facility] Progress Note


Note: 


KEPPRA LEVEL IS 45.5,CHANGE KEPPRA  MGS PO BID,REPAET KEPPRA LEVEL ON MON 06/26/17

## 2017-06-25 RX ADMIN — Medication SCH MG: at 21:15

## 2017-06-25 RX ADMIN — BUDESONIDE AND FORMOTEROL FUMARATE DIHYDRATE SCH INHALER: 80; 4.5 AEROSOL RESPIRATORY (INHALATION) at 21:16

## 2017-06-25 RX ADMIN — LEVETIRACETAM SCH MG: 500 TABLET, FILM COATED ORAL at 09:39

## 2017-06-25 RX ADMIN — BUDESONIDE AND FORMOTEROL FUMARATE DIHYDRATE SCH INHALER: 80; 4.5 AEROSOL RESPIRATORY (INHALATION) at 09:40

## 2017-06-25 RX ADMIN — NICOTINE SCH: 14 PATCH, EXTENDED RELEASE TRANSDERMAL at 09:40

## 2017-06-25 RX ADMIN — MONTELUKAST SODIUM SCH MG: 10 TABLET, COATED ORAL at 21:15

## 2017-06-25 RX ADMIN — PANTOPRAZOLE SODIUM SCH MG: 40 TABLET, DELAYED RELEASE ORAL at 09:39

## 2017-06-25 RX ADMIN — LEVETIRACETAM SCH MG: 500 TABLET, FILM COATED ORAL at 21:15

## 2017-06-25 RX ADMIN — Medication SCH TAB: at 09:39

## 2017-06-25 RX ADMIN — SERTRALINE HYDROCHLORIDE SCH MG: 25 TABLET ORAL at 09:39

## 2017-06-26 RX ADMIN — SERTRALINE HYDROCHLORIDE SCH MG: 25 TABLET ORAL at 09:55

## 2017-06-26 RX ADMIN — Medication SCH MG: at 21:15

## 2017-06-26 RX ADMIN — LEVETIRACETAM SCH MG: 500 TABLET, FILM COATED ORAL at 09:55

## 2017-06-26 RX ADMIN — LEVETIRACETAM SCH MG: 500 TABLET, FILM COATED ORAL at 21:15

## 2017-06-26 RX ADMIN — NICOTINE SCH: 14 PATCH, EXTENDED RELEASE TRANSDERMAL at 09:55

## 2017-06-26 RX ADMIN — MONTELUKAST SODIUM SCH MG: 10 TABLET, COATED ORAL at 21:15

## 2017-06-26 RX ADMIN — Medication SCH TAB: at 09:54

## 2017-06-26 RX ADMIN — BUDESONIDE AND FORMOTEROL FUMARATE DIHYDRATE SCH INHALER: 80; 4.5 AEROSOL RESPIRATORY (INHALATION) at 21:16

## 2017-06-26 RX ADMIN — PANTOPRAZOLE SODIUM SCH MG: 40 TABLET, DELAYED RELEASE ORAL at 09:55

## 2017-06-26 RX ADMIN — BUDESONIDE AND FORMOTEROL FUMARATE DIHYDRATE SCH: 80; 4.5 AEROSOL RESPIRATORY (INHALATION) at 09:55

## 2017-06-27 RX ADMIN — LEVETIRACETAM SCH MG: 500 TABLET, FILM COATED ORAL at 10:15

## 2017-06-27 RX ADMIN — NICOTINE SCH: 14 PATCH, EXTENDED RELEASE TRANSDERMAL at 10:14

## 2017-06-27 RX ADMIN — Medication SCH MG: at 21:10

## 2017-06-27 RX ADMIN — Medication SCH TAB: at 10:15

## 2017-06-27 RX ADMIN — BUDESONIDE AND FORMOTEROL FUMARATE DIHYDRATE SCH INHALER: 80; 4.5 AEROSOL RESPIRATORY (INHALATION) at 21:09

## 2017-06-27 RX ADMIN — SERTRALINE HYDROCHLORIDE SCH MG: 25 TABLET ORAL at 10:15

## 2017-06-27 RX ADMIN — PANTOPRAZOLE SODIUM SCH MG: 40 TABLET, DELAYED RELEASE ORAL at 10:15

## 2017-06-27 RX ADMIN — BUDESONIDE AND FORMOTEROL FUMARATE DIHYDRATE SCH INHALER: 80; 4.5 AEROSOL RESPIRATORY (INHALATION) at 10:16

## 2017-06-27 RX ADMIN — LEVETIRACETAM SCH MG: 500 TABLET, FILM COATED ORAL at 21:09

## 2017-06-27 RX ADMIN — MONTELUKAST SODIUM SCH MG: 10 TABLET, COATED ORAL at 21:10

## 2017-06-28 RX ADMIN — Medication SCH TAB: at 10:00

## 2017-06-28 RX ADMIN — BUDESONIDE AND FORMOTEROL FUMARATE DIHYDRATE SCH INHALER: 80; 4.5 AEROSOL RESPIRATORY (INHALATION) at 21:12

## 2017-06-28 RX ADMIN — PANTOPRAZOLE SODIUM SCH MG: 40 TABLET, DELAYED RELEASE ORAL at 10:00

## 2017-06-28 RX ADMIN — NICOTINE SCH: 14 PATCH, EXTENDED RELEASE TRANSDERMAL at 10:01

## 2017-06-28 RX ADMIN — Medication SCH MG: at 21:13

## 2017-06-28 RX ADMIN — MONTELUKAST SODIUM SCH MG: 10 TABLET, COATED ORAL at 21:13

## 2017-06-28 RX ADMIN — BUDESONIDE AND FORMOTEROL FUMARATE DIHYDRATE SCH: 80; 4.5 AEROSOL RESPIRATORY (INHALATION) at 10:01

## 2017-06-28 RX ADMIN — SERTRALINE HYDROCHLORIDE SCH MG: 25 TABLET ORAL at 10:00

## 2017-06-28 RX ADMIN — LEVETIRACETAM SCH MG: 500 TABLET, FILM COATED ORAL at 21:13

## 2017-06-28 RX ADMIN — LEVETIRACETAM SCH MG: 500 TABLET, FILM COATED ORAL at 10:00

## 2017-06-29 RX ADMIN — LEVETIRACETAM SCH MG: 500 TABLET, FILM COATED ORAL at 21:14

## 2017-06-29 RX ADMIN — HYDROXYZINE PAMOATE PRN MG: 50 CAPSULE ORAL at 09:04

## 2017-06-29 RX ADMIN — NICOTINE SCH: 14 PATCH, EXTENDED RELEASE TRANSDERMAL at 09:04

## 2017-06-29 RX ADMIN — BUDESONIDE AND FORMOTEROL FUMARATE DIHYDRATE SCH INHALER: 80; 4.5 AEROSOL RESPIRATORY (INHALATION) at 09:05

## 2017-06-29 RX ADMIN — Medication SCH MG: at 21:14

## 2017-06-29 RX ADMIN — SERTRALINE HYDROCHLORIDE SCH MG: 25 TABLET ORAL at 09:04

## 2017-06-29 RX ADMIN — PANTOPRAZOLE SODIUM SCH MG: 40 TABLET, DELAYED RELEASE ORAL at 09:04

## 2017-06-29 RX ADMIN — MONTELUKAST SODIUM SCH MG: 10 TABLET, COATED ORAL at 21:14

## 2017-06-29 RX ADMIN — BUDESONIDE AND FORMOTEROL FUMARATE DIHYDRATE SCH INHALER: 80; 4.5 AEROSOL RESPIRATORY (INHALATION) at 21:15

## 2017-06-29 RX ADMIN — Medication PRN APPLIC: at 20:01

## 2017-06-29 RX ADMIN — HYDROXYZINE PAMOATE PRN MG: 50 CAPSULE ORAL at 21:15

## 2017-06-29 RX ADMIN — LEVETIRACETAM SCH MG: 500 TABLET, FILM COATED ORAL at 09:04

## 2017-06-29 RX ADMIN — Medication SCH TAB: at 09:04

## 2017-06-30 RX ADMIN — ALUMINUM HYDROXIDE, MAGNESIUM HYDROXIDE, AND SIMETHICONE PRN ML: 200; 200; 20 SUSPENSION ORAL at 22:18

## 2017-06-30 RX ADMIN — LEVETIRACETAM SCH MG: 500 TABLET, FILM COATED ORAL at 10:09

## 2017-06-30 RX ADMIN — NICOTINE SCH: 14 PATCH, EXTENDED RELEASE TRANSDERMAL at 10:09

## 2017-06-30 RX ADMIN — LEVETIRACETAM SCH MG: 500 TABLET, FILM COATED ORAL at 21:23

## 2017-06-30 RX ADMIN — Medication SCH MG: at 21:23

## 2017-06-30 RX ADMIN — Medication SCH TAB: at 10:09

## 2017-06-30 RX ADMIN — PANTOPRAZOLE SODIUM SCH MG: 40 TABLET, DELAYED RELEASE ORAL at 10:09

## 2017-06-30 RX ADMIN — HYDROXYZINE PAMOATE PRN MG: 50 CAPSULE ORAL at 21:24

## 2017-06-30 RX ADMIN — BUDESONIDE AND FORMOTEROL FUMARATE DIHYDRATE SCH INHALER: 80; 4.5 AEROSOL RESPIRATORY (INHALATION) at 10:10

## 2017-06-30 RX ADMIN — SERTRALINE HYDROCHLORIDE SCH MG: 25 TABLET ORAL at 10:10

## 2017-06-30 RX ADMIN — HYDROXYZINE PAMOATE PRN MG: 50 CAPSULE ORAL at 10:11

## 2017-06-30 RX ADMIN — BUDESONIDE AND FORMOTEROL FUMARATE DIHYDRATE SCH INHALER: 80; 4.5 AEROSOL RESPIRATORY (INHALATION) at 21:25

## 2017-06-30 RX ADMIN — MONTELUKAST SODIUM SCH MG: 10 TABLET, COATED ORAL at 21:23

## 2017-07-01 RX ADMIN — SERTRALINE HYDROCHLORIDE SCH MG: 25 TABLET ORAL at 09:48

## 2017-07-01 RX ADMIN — LEVETIRACETAM SCH MG: 500 TABLET, FILM COATED ORAL at 21:10

## 2017-07-01 RX ADMIN — IBUPROFEN PRN MG: 400 TABLET, FILM COATED ORAL at 22:42

## 2017-07-01 RX ADMIN — HYDROXYZINE PAMOATE PRN MG: 50 CAPSULE ORAL at 16:32

## 2017-07-01 RX ADMIN — BUDESONIDE AND FORMOTEROL FUMARATE DIHYDRATE SCH INHALER: 80; 4.5 AEROSOL RESPIRATORY (INHALATION) at 21:10

## 2017-07-01 RX ADMIN — BUDESONIDE AND FORMOTEROL FUMARATE DIHYDRATE SCH INHALER: 80; 4.5 AEROSOL RESPIRATORY (INHALATION) at 09:48

## 2017-07-01 RX ADMIN — Medication SCH MG: at 21:11

## 2017-07-01 RX ADMIN — PANTOPRAZOLE SODIUM SCH MG: 40 TABLET, DELAYED RELEASE ORAL at 09:48

## 2017-07-01 RX ADMIN — NICOTINE SCH: 14 PATCH, EXTENDED RELEASE TRANSDERMAL at 09:48

## 2017-07-01 RX ADMIN — LEVETIRACETAM SCH MG: 500 TABLET, FILM COATED ORAL at 09:48

## 2017-07-01 RX ADMIN — MONTELUKAST SODIUM SCH MG: 10 TABLET, COATED ORAL at 21:10

## 2017-07-01 RX ADMIN — Medication SCH TAB: at 09:48

## 2017-07-02 RX ADMIN — HYDROXYZINE PAMOATE PRN MG: 50 CAPSULE ORAL at 06:11

## 2017-07-02 RX ADMIN — Medication SCH TAB: at 10:11

## 2017-07-02 RX ADMIN — BUDESONIDE AND FORMOTEROL FUMARATE DIHYDRATE SCH: 80; 4.5 AEROSOL RESPIRATORY (INHALATION) at 10:10

## 2017-07-02 RX ADMIN — Medication SCH MG: at 21:20

## 2017-07-02 RX ADMIN — Medication PRN APPLIC: at 21:25

## 2017-07-02 RX ADMIN — LEVETIRACETAM SCH MG: 500 TABLET, FILM COATED ORAL at 10:11

## 2017-07-02 RX ADMIN — NICOTINE SCH: 14 PATCH, EXTENDED RELEASE TRANSDERMAL at 10:11

## 2017-07-02 RX ADMIN — HYDROXYZINE PAMOATE PRN MG: 50 CAPSULE ORAL at 21:20

## 2017-07-02 RX ADMIN — HYDROXYZINE PAMOATE PRN MG: 50 CAPSULE ORAL at 10:13

## 2017-07-02 RX ADMIN — SERTRALINE HYDROCHLORIDE SCH MG: 25 TABLET ORAL at 10:11

## 2017-07-02 RX ADMIN — MONTELUKAST SODIUM SCH MG: 10 TABLET, COATED ORAL at 21:20

## 2017-07-02 RX ADMIN — PANTOPRAZOLE SODIUM SCH MG: 40 TABLET, DELAYED RELEASE ORAL at 10:11

## 2017-07-02 RX ADMIN — LEVETIRACETAM SCH MG: 500 TABLET, FILM COATED ORAL at 21:20

## 2017-07-02 RX ADMIN — BUDESONIDE AND FORMOTEROL FUMARATE DIHYDRATE SCH INHALER: 80; 4.5 AEROSOL RESPIRATORY (INHALATION) at 21:20

## 2017-07-03 RX ADMIN — ACETAMINOPHEN PRN MG: 325 TABLET ORAL at 21:31

## 2017-07-03 RX ADMIN — Medication SCH TAB: at 10:06

## 2017-07-03 RX ADMIN — Medication SCH MG: at 21:30

## 2017-07-03 RX ADMIN — LEVETIRACETAM SCH MG: 500 TABLET, FILM COATED ORAL at 10:06

## 2017-07-03 RX ADMIN — BUDESONIDE AND FORMOTEROL FUMARATE DIHYDRATE SCH: 80; 4.5 AEROSOL RESPIRATORY (INHALATION) at 21:31

## 2017-07-03 RX ADMIN — BUDESONIDE AND FORMOTEROL FUMARATE DIHYDRATE SCH INHALER: 80; 4.5 AEROSOL RESPIRATORY (INHALATION) at 10:06

## 2017-07-03 RX ADMIN — LEVETIRACETAM SCH MG: 500 TABLET, FILM COATED ORAL at 21:30

## 2017-07-03 RX ADMIN — MONTELUKAST SODIUM SCH MG: 10 TABLET, COATED ORAL at 21:30

## 2017-07-03 RX ADMIN — PANTOPRAZOLE SODIUM SCH MG: 40 TABLET, DELAYED RELEASE ORAL at 10:05

## 2017-07-03 RX ADMIN — NICOTINE SCH: 14 PATCH, EXTENDED RELEASE TRANSDERMAL at 10:06

## 2017-07-03 RX ADMIN — IBUPROFEN PRN MG: 400 TABLET, FILM COATED ORAL at 10:07

## 2017-07-03 RX ADMIN — SERTRALINE HYDROCHLORIDE SCH MG: 25 TABLET ORAL at 10:08

## 2017-07-04 RX ADMIN — AMLODIPINE BESYLATE SCH MG: 5 TABLET ORAL at 13:24

## 2017-07-04 RX ADMIN — ALUMINUM HYDROXIDE, MAGNESIUM HYDROXIDE, AND SIMETHICONE PRN ML: 200; 200; 20 SUSPENSION ORAL at 20:14

## 2017-07-04 RX ADMIN — SERTRALINE HYDROCHLORIDE SCH MG: 25 TABLET ORAL at 09:32

## 2017-07-04 RX ADMIN — HYDROXYZINE PAMOATE PRN MG: 50 CAPSULE ORAL at 09:33

## 2017-07-04 RX ADMIN — Medication SCH TAB: at 09:32

## 2017-07-04 RX ADMIN — LEVETIRACETAM SCH MG: 500 TABLET, FILM COATED ORAL at 09:31

## 2017-07-04 RX ADMIN — NICOTINE SCH: 14 PATCH, EXTENDED RELEASE TRANSDERMAL at 09:32

## 2017-07-04 RX ADMIN — BUDESONIDE AND FORMOTEROL FUMARATE DIHYDRATE SCH INHALER: 80; 4.5 AEROSOL RESPIRATORY (INHALATION) at 09:32

## 2017-07-04 RX ADMIN — PANTOPRAZOLE SODIUM SCH MG: 40 TABLET, DELAYED RELEASE ORAL at 09:32

## 2017-07-04 RX ADMIN — Medication SCH MG: at 21:02

## 2017-07-04 RX ADMIN — LEVETIRACETAM SCH MG: 500 TABLET, FILM COATED ORAL at 21:02

## 2017-07-04 RX ADMIN — MONTELUKAST SODIUM SCH MG: 10 TABLET, COATED ORAL at 21:02

## 2017-07-04 RX ADMIN — ACETAMINOPHEN PRN MG: 325 TABLET ORAL at 21:04

## 2017-07-04 RX ADMIN — BUDESONIDE AND FORMOTEROL FUMARATE DIHYDRATE SCH: 80; 4.5 AEROSOL RESPIRATORY (INHALATION) at 21:02

## 2017-07-05 RX ADMIN — HYDROXYZINE PAMOATE PRN MG: 50 CAPSULE ORAL at 09:32

## 2017-07-05 RX ADMIN — LEVETIRACETAM SCH MG: 500 TABLET, FILM COATED ORAL at 21:15

## 2017-07-05 RX ADMIN — BUDESONIDE AND FORMOTEROL FUMARATE DIHYDRATE SCH INHALER: 80; 4.5 AEROSOL RESPIRATORY (INHALATION) at 09:31

## 2017-07-05 RX ADMIN — MONTELUKAST SODIUM SCH MG: 10 TABLET, COATED ORAL at 21:15

## 2017-07-05 RX ADMIN — Medication SCH TAB: at 09:31

## 2017-07-05 RX ADMIN — Medication SCH MG: at 21:15

## 2017-07-05 RX ADMIN — AMLODIPINE BESYLATE SCH MG: 5 TABLET ORAL at 09:30

## 2017-07-05 RX ADMIN — BUDESONIDE AND FORMOTEROL FUMARATE DIHYDRATE SCH: 80; 4.5 AEROSOL RESPIRATORY (INHALATION) at 21:15

## 2017-07-05 RX ADMIN — PANTOPRAZOLE SODIUM SCH MG: 40 TABLET, DELAYED RELEASE ORAL at 09:31

## 2017-07-05 RX ADMIN — SERTRALINE HYDROCHLORIDE SCH MG: 25 TABLET ORAL at 09:31

## 2017-07-05 RX ADMIN — IBUPROFEN PRN MG: 400 TABLET, FILM COATED ORAL at 21:17

## 2017-07-05 RX ADMIN — LEVETIRACETAM SCH MG: 500 TABLET, FILM COATED ORAL at 09:30

## 2017-07-05 RX ADMIN — NICOTINE SCH: 14 PATCH, EXTENDED RELEASE TRANSDERMAL at 09:30

## 2017-07-06 RX ADMIN — MONTELUKAST SODIUM SCH MG: 10 TABLET, COATED ORAL at 21:12

## 2017-07-06 RX ADMIN — BUDESONIDE AND FORMOTEROL FUMARATE DIHYDRATE SCH INHALER: 80; 4.5 AEROSOL RESPIRATORY (INHALATION) at 09:37

## 2017-07-06 RX ADMIN — AMLODIPINE BESYLATE SCH MG: 5 TABLET ORAL at 09:37

## 2017-07-06 RX ADMIN — ACETAMINOPHEN PRN MG: 325 TABLET ORAL at 22:38

## 2017-07-06 RX ADMIN — IBUPROFEN PRN MG: 400 TABLET, FILM COATED ORAL at 16:23

## 2017-07-06 RX ADMIN — LEVETIRACETAM SCH MG: 500 TABLET, FILM COATED ORAL at 21:12

## 2017-07-06 RX ADMIN — SERTRALINE HYDROCHLORIDE SCH MG: 25 TABLET ORAL at 09:37

## 2017-07-06 RX ADMIN — Medication SCH MG: at 21:12

## 2017-07-06 RX ADMIN — LEVETIRACETAM SCH MG: 500 TABLET, FILM COATED ORAL at 09:36

## 2017-07-06 RX ADMIN — Medication SCH TAB: at 09:36

## 2017-07-06 RX ADMIN — NICOTINE SCH: 14 PATCH, EXTENDED RELEASE TRANSDERMAL at 09:36

## 2017-07-06 RX ADMIN — PANTOPRAZOLE SODIUM SCH MG: 40 TABLET, DELAYED RELEASE ORAL at 09:36

## 2017-07-06 RX ADMIN — HYDROXYZINE PAMOATE PRN MG: 50 CAPSULE ORAL at 06:33

## 2017-07-06 RX ADMIN — BUDESONIDE AND FORMOTEROL FUMARATE DIHYDRATE SCH INHALER: 80; 4.5 AEROSOL RESPIRATORY (INHALATION) at 21:11

## 2017-07-07 RX ADMIN — BUDESONIDE AND FORMOTEROL FUMARATE DIHYDRATE SCH INHALER: 80; 4.5 AEROSOL RESPIRATORY (INHALATION) at 21:24

## 2017-07-07 RX ADMIN — ACETAMINOPHEN PRN MG: 325 TABLET ORAL at 21:24

## 2017-07-07 RX ADMIN — NICOTINE SCH: 14 PATCH, EXTENDED RELEASE TRANSDERMAL at 09:28

## 2017-07-07 RX ADMIN — HYDROXYZINE PAMOATE PRN MG: 50 CAPSULE ORAL at 07:11

## 2017-07-07 RX ADMIN — AMLODIPINE BESYLATE SCH MG: 5 TABLET ORAL at 09:27

## 2017-07-07 RX ADMIN — IBUPROFEN PRN MG: 400 TABLET, FILM COATED ORAL at 03:34

## 2017-07-07 RX ADMIN — SERTRALINE HYDROCHLORIDE SCH MG: 25 TABLET ORAL at 09:27

## 2017-07-07 RX ADMIN — LEVETIRACETAM SCH MG: 500 TABLET, FILM COATED ORAL at 21:24

## 2017-07-07 RX ADMIN — Medication SCH TAB: at 09:26

## 2017-07-07 RX ADMIN — Medication PRN APPLIC: at 09:27

## 2017-07-07 RX ADMIN — LEVETIRACETAM SCH MG: 500 TABLET, FILM COATED ORAL at 09:27

## 2017-07-07 RX ADMIN — Medication SCH MG: at 21:24

## 2017-07-07 RX ADMIN — BUDESONIDE AND FORMOTEROL FUMARATE DIHYDRATE SCH INHALER: 80; 4.5 AEROSOL RESPIRATORY (INHALATION) at 09:26

## 2017-07-07 RX ADMIN — PANTOPRAZOLE SODIUM SCH MG: 40 TABLET, DELAYED RELEASE ORAL at 09:26

## 2017-07-07 RX ADMIN — MONTELUKAST SODIUM SCH MG: 10 TABLET, COATED ORAL at 21:24

## 2017-07-08 RX ADMIN — BUDESONIDE AND FORMOTEROL FUMARATE DIHYDRATE SCH: 80; 4.5 AEROSOL RESPIRATORY (INHALATION) at 21:34

## 2017-07-08 RX ADMIN — Medication SCH MG: at 21:28

## 2017-07-08 RX ADMIN — LEVETIRACETAM SCH MG: 500 TABLET, FILM COATED ORAL at 09:30

## 2017-07-08 RX ADMIN — LEVETIRACETAM SCH MG: 500 TABLET, FILM COATED ORAL at 21:29

## 2017-07-08 RX ADMIN — AMLODIPINE BESYLATE SCH MG: 5 TABLET ORAL at 09:41

## 2017-07-08 RX ADMIN — SERTRALINE HYDROCHLORIDE SCH MG: 25 TABLET ORAL at 09:42

## 2017-07-08 RX ADMIN — MONTELUKAST SODIUM SCH MG: 10 TABLET, COATED ORAL at 21:28

## 2017-07-08 RX ADMIN — Medication SCH TAB: at 09:41

## 2017-07-08 RX ADMIN — NICOTINE SCH: 14 PATCH, EXTENDED RELEASE TRANSDERMAL at 09:42

## 2017-07-08 RX ADMIN — HYDROXYZINE PAMOATE PRN MG: 50 CAPSULE ORAL at 09:45

## 2017-07-08 RX ADMIN — BUDESONIDE AND FORMOTEROL FUMARATE DIHYDRATE SCH INHALER: 80; 4.5 AEROSOL RESPIRATORY (INHALATION) at 09:44

## 2017-07-08 RX ADMIN — PANTOPRAZOLE SODIUM SCH MG: 40 TABLET, DELAYED RELEASE ORAL at 09:41

## 2017-07-08 RX ADMIN — Medication PRN APPLIC: at 09:42

## 2017-07-09 RX ADMIN — NICOTINE SCH: 14 PATCH, EXTENDED RELEASE TRANSDERMAL at 11:37

## 2017-07-09 RX ADMIN — GUAIFENESIN AND DEXTROMETHORPHAN PRN ML: 100; 10 SYRUP ORAL at 22:31

## 2017-07-09 RX ADMIN — SERTRALINE HYDROCHLORIDE SCH MG: 25 TABLET ORAL at 09:33

## 2017-07-09 RX ADMIN — MONTELUKAST SODIUM SCH MG: 10 TABLET, COATED ORAL at 21:14

## 2017-07-09 RX ADMIN — LEVETIRACETAM SCH MG: 500 TABLET, FILM COATED ORAL at 21:14

## 2017-07-09 RX ADMIN — Medication SCH TAB: at 09:32

## 2017-07-09 RX ADMIN — LEVETIRACETAM SCH MG: 500 TABLET, FILM COATED ORAL at 09:32

## 2017-07-09 RX ADMIN — ACETAMINOPHEN PRN MG: 325 TABLET ORAL at 22:31

## 2017-07-09 RX ADMIN — BUDESONIDE AND FORMOTEROL FUMARATE DIHYDRATE SCH: 80; 4.5 AEROSOL RESPIRATORY (INHALATION) at 21:14

## 2017-07-09 RX ADMIN — BUDESONIDE AND FORMOTEROL FUMARATE DIHYDRATE SCH INHALER: 80; 4.5 AEROSOL RESPIRATORY (INHALATION) at 09:33

## 2017-07-09 RX ADMIN — PANTOPRAZOLE SODIUM SCH MG: 40 TABLET, DELAYED RELEASE ORAL at 09:32

## 2017-07-09 RX ADMIN — HYDROXYZINE PAMOATE PRN MG: 50 CAPSULE ORAL at 06:35

## 2017-07-09 RX ADMIN — HYDROXYZINE PAMOATE PRN MG: 50 CAPSULE ORAL at 11:14

## 2017-07-09 RX ADMIN — AMLODIPINE BESYLATE SCH MG: 5 TABLET ORAL at 09:33

## 2017-07-09 RX ADMIN — Medication SCH MG: at 21:14

## 2017-07-10 VITALS — TEMPERATURE: 98.1 F

## 2017-07-10 RX ADMIN — HYDROXYZINE PAMOATE PRN MG: 50 CAPSULE ORAL at 09:33

## 2017-07-10 RX ADMIN — Medication SCH TAB: at 09:31

## 2017-07-10 RX ADMIN — PANTOPRAZOLE SODIUM SCH MG: 40 TABLET, DELAYED RELEASE ORAL at 09:31

## 2017-07-10 RX ADMIN — SERTRALINE HYDROCHLORIDE SCH MG: 25 TABLET ORAL at 09:32

## 2017-07-10 RX ADMIN — BUDESONIDE AND FORMOTEROL FUMARATE DIHYDRATE SCH INHALER: 80; 4.5 AEROSOL RESPIRATORY (INHALATION) at 21:17

## 2017-07-10 RX ADMIN — HYDROXYZINE PAMOATE PRN MG: 50 CAPSULE ORAL at 17:01

## 2017-07-10 RX ADMIN — MONTELUKAST SODIUM SCH MG: 10 TABLET, COATED ORAL at 21:17

## 2017-07-10 RX ADMIN — LEVETIRACETAM SCH MG: 500 TABLET, FILM COATED ORAL at 09:31

## 2017-07-10 RX ADMIN — NICOTINE SCH: 14 PATCH, EXTENDED RELEASE TRANSDERMAL at 09:31

## 2017-07-10 RX ADMIN — AMLODIPINE BESYLATE SCH MG: 5 TABLET ORAL at 09:31

## 2017-07-10 RX ADMIN — BUDESONIDE AND FORMOTEROL FUMARATE DIHYDRATE SCH INHALER: 80; 4.5 AEROSOL RESPIRATORY (INHALATION) at 09:31

## 2017-07-10 RX ADMIN — LEVETIRACETAM SCH MG: 500 TABLET, FILM COATED ORAL at 21:16

## 2017-07-10 RX ADMIN — Medication SCH MG: at 21:17

## 2017-07-10 NOTE — PN
Psychiatric Progress Note


Vital Signs: 


 Vital Signs











 Period  Temp  Pulse  Resp  BP Sys/Neal  Pulse Ox


 


 Last 24 Hr  98.1 F  87-91  16-18  117-149/74-77  











Date of Session: 07/10/17


Chief Complaint:: discharge visit


HPI: Alex addressed Alcohol dependence comorbid with Alcohol induced mood 

disorder.


ROS: Significant for HTN,Seizure disorder,GERD,COPD,Anemia,Chronic  arthritis 

with Neuropathy.


Current Medications: 


Active Medications











Generic Name Dose Route Start Last Admin





  Trade Name Freq  PRN Reason Stop Dose Admin


 


Acetaminophen  650 mg 06/20/17 15:10 07/09/17 22:31





  Tylenol -  PO   650 mg





  Q4H PRN   Administration





  FEVER OR PAIN   


 


Al Hydroxide/Mg Hydroxide  30 ml 06/20/17 15:10 07/04/17 20:14





  Mylanta Oral Suspension -  PO   30 ml





  Q6H PRN   Administration





  DYSPEPSIA   


 


Albuterol Sulfate  2 puff 06/20/17 15:25  





  Ventolin Hfa Inhaler -  IH   





  Q4H PRN   





  SHORT OF BREATH/WHEEZING   


 


Amlodipine Besylate  5 mg 07/04/17 13:15 07/10/17 09:31





  Norvasc -  PO   5 mg





  DAILY TI   Administration


 


Budesonide/Formoterol Fumarate  2 puff 06/20/17 22:00 07/10/17 09:31





  Symbicort 80/4.5mcg -  IH   2 inhaler





  BID TI   Administration


 


Diphenhydramine HCl  50 mg 06/20/17 15:10 07/09/17 21:14





  Benadryl -  PO   50 mg





  HSMR1 PRN   Administration





  FOR ITCHING   


 


Eucalyptus/Menthol/Phenol/Sorbitol  1 each 06/20/17 15:10  





  Cepastat Lozenge -  MM   





  Q4H PRN   





  SORE THROAT   


 


Guaifenesin  10 ml 06/20/17 15:10 07/09/17 22:31





  Robitussin Dm -  PO   10 ml





  Q6H PRN   Administration





  COUGH   


 


Hydroxyzine Pamoate  50 mg 06/21/17 14:26 07/10/17 09:33





  Vistaril -  PO   50 mg





  Q4H PRN   Administration





  ANXIETY   


 


Ibuprofen  400 mg 06/20/17 15:10 07/07/17 03:34





  Motrin -  PO   400 mg





  Q6H PRN   Administration





  PAIN   


 


Lactic Acid  1 applic 06/29/17 14:57 07/08/17 09:42





  Lac-Hydrin 12  TP   1 applic





  BID PRN   Administration





  DRY SKIN   


 


Levetiracetam  500 mg 06/24/17 10:00 07/10/17 09:31





  Keppra -  PO   500 mg





  BID TI   Administration


 


Loperamide HCl  4 mg 06/20/17 15:10 06/26/17 09:55





  Imodium -  PO   4 mg





  Q6H PRN   Administration





  DIARRHEA   


 


Magnesium Hydroxide  30 ml 06/20/17 15:10  





  Milk Of Magnesia -  PO   





  DAILY PRN   





  CONSTIPATION   


 


Montelukast Sodium  10 mg 06/20/17 22:00 07/09/17 21:14





  Singulair -  PO   10 mg





  HS TI   Administration


 


Nicotine  14 mg 06/21/17 10:00 07/10/17 09:31





  Nicoderm Patch -  TD   Not Given





  DAILY TI   


 


Nicotine Polacrilex  2 mg 06/20/17 15:10  





  Nicorette Gum -  BUC   





  Q2H PRN   





  NICOTINE REPLACEMENT RX   


 


Pantoprazole Sodium  40 mg 06/21/17 10:00 07/10/17 09:31





  Protonix -  PO   40 mg





  DAILY TI   Administration


 


Prenatal Multivit/Folic Acid/Iron  1 tab 06/21/17 10:00 07/10/17 09:31





  Prenatal Vitamins (Sjr) -  PO   1 tab





  DAILY TI   Administration


 


Pseudoephedrine/Triprolidine  1 combo 06/20/17 15:10  





  Actifed -  PO   





  TID PRN   





  NASAL CONGESTION   


 


Sertraline HCl  25 mg 06/21/17 14:30 07/10/17 09:32





  Zoloft -  PO   25 mg





  DAILY TI   Administration


 


Thiamine HCl  100 mg 06/20/17 22:00 07/09/17 21:14





  Vitamin B1 -  PO   100 mg





  HS TI   Administration











Current Side Effect: No


Lab tests ordered: No


Lab tests reviewed: Yes


Provider note:: The patient will complete this program tomorrow 07/11/17.She 

has met her treatment goals and will continue to address her issues on 

outpatient basis at [facility] in the [place].Patient 

will continue Zoloft 25 mg po daily to control her depressed mood and anxiety,

Scripts for 30 days supply of the above medication  provided.  Therapy provided 

focusing on relapase prevention including coping skills,support system 

utilization to maintain recovery.  Patient is stable for discharge tomorrow 07/ 11/17.


Total face to face time:: 35





Mental Status Exam





- Mental Status Exam


Alert and Oriented to: Time, Place, Person


Cognitive Function: Grossly Intact


Patient Appearance: Well Groomed


Mood: Euthymic


Affect: Mood Congruent, Euthymic


Patient Behavior: Cooperative


Speech Pattern: Clear


Voice Loudness: Normal


Thought Process: Goal Oriented


Thought Disorder: Not Present


Hallucinations: Denies


Suicidal Ideation: Denies


Homicidal Ideation: Denies


Insight/Judgement: Fair


Sleep: Fair


Appetite: Fair


Muscle strength/Tone: Normal


Gait/Station: Normal





Psychiatric Treatment Plan





- Problem List














(4) Anemia





Comment: .








(5) Asthma


Qualifiers: 


   


Comment: .








(6) COPD (chronic obstructive pulmonary disease)





Comment: .

## 2017-07-11 VITALS — HEART RATE: 94 BPM | DIASTOLIC BLOOD PRESSURE: 71 MMHG | SYSTOLIC BLOOD PRESSURE: 123 MMHG

## 2017-07-11 PROCEDURE — HZ42ZZZ GROUP COUNSELING FOR SUBSTANCE ABUSE TREATMENT, COGNITIVE-BEHAVIORAL: ICD-10-PCS | Performed by: PSYCHIATRY & NEUROLOGY

## 2017-07-11 RX ADMIN — NICOTINE SCH: 14 PATCH, EXTENDED RELEASE TRANSDERMAL at 09:34

## 2017-07-11 RX ADMIN — HYDROXYZINE PAMOATE PRN MG: 50 CAPSULE ORAL at 09:21

## 2017-07-11 RX ADMIN — PANTOPRAZOLE SODIUM SCH MG: 40 TABLET, DELAYED RELEASE ORAL at 09:18

## 2017-07-11 RX ADMIN — SERTRALINE HYDROCHLORIDE SCH MG: 25 TABLET ORAL at 09:19

## 2017-07-11 RX ADMIN — LEVETIRACETAM SCH MG: 500 TABLET, FILM COATED ORAL at 09:18

## 2017-07-11 RX ADMIN — Medication SCH TAB: at 09:18

## 2017-07-11 RX ADMIN — BUDESONIDE AND FORMOTEROL FUMARATE DIHYDRATE SCH INHALER: 80; 4.5 AEROSOL RESPIRATORY (INHALATION) at 09:22

## 2017-07-11 RX ADMIN — HYDROXYZINE PAMOATE PRN MG: 50 CAPSULE ORAL at 09:20

## 2017-07-11 RX ADMIN — AMLODIPINE BESYLATE SCH MG: 5 TABLET ORAL at 09:18

## 2021-11-22 ENCOUNTER — HOSPITAL ENCOUNTER (INPATIENT)
Dept: HOSPITAL 74 - YASAS | Age: 66
LOS: 4 days | Discharge: HOME | DRG: 897 | End: 2021-11-26
Attending: ALLERGY & IMMUNOLOGY | Admitting: ALLERGY & IMMUNOLOGY
Payer: COMMERCIAL

## 2021-11-22 VITALS — BODY MASS INDEX: 18.6 KG/M2

## 2021-11-22 DIAGNOSIS — I10: ICD-10-CM

## 2021-11-22 DIAGNOSIS — F17.213: ICD-10-CM

## 2021-11-22 DIAGNOSIS — Z88.0: ICD-10-CM

## 2021-11-22 DIAGNOSIS — G40.909: ICD-10-CM

## 2021-11-22 DIAGNOSIS — H40.9: ICD-10-CM

## 2021-11-22 DIAGNOSIS — Z85.118: ICD-10-CM

## 2021-11-22 DIAGNOSIS — Z86.2: ICD-10-CM

## 2021-11-22 DIAGNOSIS — F10.230: Primary | ICD-10-CM

## 2021-11-22 DIAGNOSIS — E78.5: ICD-10-CM

## 2021-11-22 DIAGNOSIS — F10.282: ICD-10-CM

## 2021-11-22 DIAGNOSIS — K21.9: ICD-10-CM

## 2021-11-22 DIAGNOSIS — F10.280: ICD-10-CM

## 2021-11-22 DIAGNOSIS — J44.9: ICD-10-CM

## 2021-11-22 LAB
ALBUMIN SERPL-MCNC: 3.7 G/DL (ref 3.4–5)
ALP SERPL-CCNC: 108 U/L (ref 45–117)
ALT SERPL-CCNC: 47 U/L (ref 13–61)
ANION GAP SERPL CALC-SCNC: 9 MMOL/L (ref 8–16)
AST SERPL-CCNC: 68 U/L (ref 15–37)
BILIRUB SERPL-MCNC: 0.5 MG/DL (ref 0.2–1)
BUN SERPL-MCNC: 4.9 MG/DL (ref 7–18)
CALCIUM SERPL-MCNC: 9 MG/DL (ref 8.5–10.1)
CHLORIDE SERPL-SCNC: 100 MMOL/L (ref 98–107)
CO2 SERPL-SCNC: 24 MMOL/L (ref 21–32)
CREAT SERPL-MCNC: 0.7 MG/DL (ref 0.55–1.3)
DEPRECATED RDW RBC AUTO: 13.7 % (ref 11.6–15.6)
GLUCOSE SERPL-MCNC: 58 MG/DL (ref 74–106)
HCT VFR BLD CALC: 35.4 % (ref 32.4–45.2)
HGB BLD-MCNC: 12 GM/DL (ref 10.7–15.3)
MCH RBC QN AUTO: 30.8 PG (ref 25.7–33.7)
MCHC RBC AUTO-ENTMCNC: 33.8 G/DL (ref 32–36)
MCV RBC: 91.1 FL (ref 80–96)
PLATELET # BLD AUTO: 225 10^3/UL (ref 134–434)
PMV BLD: 9 FL (ref 7.5–11.1)
PROT SERPL-MCNC: 8.4 G/DL (ref 6.4–8.2)
RBC # BLD AUTO: 3.89 M/MM3 (ref 3.6–5.2)
SODIUM SERPL-SCNC: 133 MMOL/L (ref 136–145)
WBC # BLD AUTO: 4.8 K/MM3 (ref 4–10)

## 2021-11-22 PROCEDURE — HZ2ZZZZ DETOXIFICATION SERVICES FOR SUBSTANCE ABUSE TREATMENT: ICD-10-PCS | Performed by: ALLERGY & IMMUNOLOGY

## 2021-11-22 PROCEDURE — U0003 INFECTIOUS AGENT DETECTION BY NUCLEIC ACID (DNA OR RNA); SEVERE ACUTE RESPIRATORY SYNDROME CORONAVIRUS 2 (SARS-COV-2) (CORONAVIRUS DISEASE [COVID-19]), AMPLIFIED PROBE TECHNIQUE, MAKING USE OF HIGH THROUGHPUT TECHNOLOGIES AS DESCRIBED BY CMS-2020-01-R: HCPCS

## 2021-11-22 PROCEDURE — U0005 INFEC AGEN DETEC AMPLI PROBE: HCPCS

## 2021-11-22 PROCEDURE — C9803 HOPD COVID-19 SPEC COLLECT: HCPCS

## 2021-11-22 RX ADMIN — Medication SCH TAB: at 12:58

## 2021-11-22 RX ADMIN — METHOCARBAMOL PRN MG: 500 TABLET ORAL at 12:57

## 2021-11-22 RX ADMIN — BUDESONIDE AND FORMOTEROL FUMARATE DIHYDRATE SCH PUFF: 80; 4.5 AEROSOL RESPIRATORY (INHALATION) at 22:26

## 2021-11-22 RX ADMIN — MONTELUKAST SODIUM SCH MG: 10 TABLET, COATED ORAL at 22:27

## 2021-11-22 RX ADMIN — ATORVASTATIN CALCIUM SCH MG: 40 TABLET, FILM COATED ORAL at 22:28

## 2021-11-22 RX ADMIN — Medication SCH MG: at 22:28

## 2021-11-22 RX ADMIN — GABAPENTIN SCH MG: 100 CAPSULE ORAL at 13:54

## 2021-11-22 RX ADMIN — NICOTINE SCH MG: 14 PATCH, EXTENDED RELEASE TRANSDERMAL at 12:58

## 2021-11-22 RX ADMIN — HYDROXYZINE PAMOATE PRN MG: 25 CAPSULE ORAL at 22:29

## 2021-11-22 RX ADMIN — LATANOPROST SCH DROP: 50 SOLUTION OPHTHALMIC at 22:32

## 2021-11-22 RX ADMIN — GABAPENTIN SCH MG: 100 CAPSULE ORAL at 22:28

## 2021-11-23 RX ADMIN — ATORVASTATIN CALCIUM SCH MG: 40 TABLET, FILM COATED ORAL at 22:29

## 2021-11-23 RX ADMIN — BUDESONIDE AND FORMOTEROL FUMARATE DIHYDRATE SCH PUFF: 80; 4.5 AEROSOL RESPIRATORY (INHALATION) at 10:26

## 2021-11-23 RX ADMIN — Medication SCH MG: at 22:29

## 2021-11-23 RX ADMIN — NICOTINE SCH MG: 14 PATCH, EXTENDED RELEASE TRANSDERMAL at 10:28

## 2021-11-23 RX ADMIN — ASPIRIN 81 MG SCH MG: 81 TABLET ORAL at 10:27

## 2021-11-23 RX ADMIN — GABAPENTIN SCH MG: 100 CAPSULE ORAL at 13:57

## 2021-11-23 RX ADMIN — HYDROXYZINE PAMOATE PRN MG: 25 CAPSULE ORAL at 10:27

## 2021-11-23 RX ADMIN — FAMOTIDINE SCH MG: 20 TABLET ORAL at 13:57

## 2021-11-23 RX ADMIN — GABAPENTIN SCH MG: 100 CAPSULE ORAL at 06:35

## 2021-11-23 RX ADMIN — TIOTROPIUM BROMIDE INHALATION SPRAY SCH PUFF: 3.12 SPRAY, METERED RESPIRATORY (INHALATION) at 12:51

## 2021-11-23 RX ADMIN — GABAPENTIN SCH MG: 100 CAPSULE ORAL at 22:30

## 2021-11-23 RX ADMIN — SERTRALINE HYDROCHLORIDE SCH MG: 50 TABLET ORAL at 10:27

## 2021-11-23 RX ADMIN — FAMOTIDINE SCH MG: 20 TABLET ORAL at 22:29

## 2021-11-23 RX ADMIN — Medication SCH TAB: at 10:26

## 2021-11-23 RX ADMIN — METHOCARBAMOL PRN MG: 500 TABLET ORAL at 10:27

## 2021-11-23 RX ADMIN — MONTELUKAST SODIUM SCH MG: 10 TABLET, COATED ORAL at 22:29

## 2021-11-23 RX ADMIN — BUDESONIDE AND FORMOTEROL FUMARATE DIHYDRATE SCH PUFF: 80; 4.5 AEROSOL RESPIRATORY (INHALATION) at 22:31

## 2021-11-23 RX ADMIN — LATANOPROST SCH DROP: 50 SOLUTION OPHTHALMIC at 22:34

## 2021-11-24 RX ADMIN — Medication SCH APPLIC: at 22:23

## 2021-11-24 RX ADMIN — TIOTROPIUM BROMIDE INHALATION SPRAY SCH: 3.12 SPRAY, METERED RESPIRATORY (INHALATION) at 10:52

## 2021-11-24 RX ADMIN — FAMOTIDINE SCH MG: 20 TABLET ORAL at 10:47

## 2021-11-24 RX ADMIN — BUDESONIDE AND FORMOTEROL FUMARATE DIHYDRATE SCH: 80; 4.5 AEROSOL RESPIRATORY (INHALATION) at 10:52

## 2021-11-24 RX ADMIN — SERTRALINE HYDROCHLORIDE SCH MG: 50 TABLET ORAL at 10:00

## 2021-11-24 RX ADMIN — BUDESONIDE AND FORMOTEROL FUMARATE DIHYDRATE SCH PUFF: 80; 4.5 AEROSOL RESPIRATORY (INHALATION) at 22:23

## 2021-11-24 RX ADMIN — Medication SCH MG: at 22:20

## 2021-11-24 RX ADMIN — Medication SCH TAB: at 10:46

## 2021-11-24 RX ADMIN — MONTELUKAST SODIUM SCH MG: 10 TABLET, COATED ORAL at 22:22

## 2021-11-24 RX ADMIN — ATORVASTATIN CALCIUM SCH MG: 40 TABLET, FILM COATED ORAL at 22:20

## 2021-11-24 RX ADMIN — LATANOPROST SCH DROP: 50 SOLUTION OPHTHALMIC at 22:24

## 2021-11-24 RX ADMIN — GABAPENTIN SCH MG: 100 CAPSULE ORAL at 22:20

## 2021-11-24 RX ADMIN — GABAPENTIN SCH MG: 100 CAPSULE ORAL at 06:02

## 2021-11-24 RX ADMIN — Medication SCH MG: at 22:23

## 2021-11-24 RX ADMIN — NICOTINE SCH MG: 14 PATCH, EXTENDED RELEASE TRANSDERMAL at 10:49

## 2021-11-24 RX ADMIN — GABAPENTIN SCH MG: 100 CAPSULE ORAL at 14:00

## 2021-11-24 RX ADMIN — ASPIRIN 81 MG SCH MG: 81 TABLET ORAL at 10:47

## 2021-11-24 RX ADMIN — HYDROXYZINE PAMOATE PRN MG: 25 CAPSULE ORAL at 06:01

## 2021-11-24 RX ADMIN — FAMOTIDINE SCH MG: 20 TABLET ORAL at 22:20

## 2021-11-25 RX ADMIN — GABAPENTIN SCH MG: 100 CAPSULE ORAL at 22:25

## 2021-11-25 RX ADMIN — Medication SCH TAB: at 10:35

## 2021-11-25 RX ADMIN — FAMOTIDINE SCH MG: 20 TABLET ORAL at 10:35

## 2021-11-25 RX ADMIN — BUDESONIDE AND FORMOTEROL FUMARATE DIHYDRATE SCH PUFF: 80; 4.5 AEROSOL RESPIRATORY (INHALATION) at 22:26

## 2021-11-25 RX ADMIN — BUDESONIDE AND FORMOTEROL FUMARATE DIHYDRATE SCH PUFF: 80; 4.5 AEROSOL RESPIRATORY (INHALATION) at 10:34

## 2021-11-25 RX ADMIN — Medication SCH MG: at 22:25

## 2021-11-25 RX ADMIN — NICOTINE SCH MG: 14 PATCH, EXTENDED RELEASE TRANSDERMAL at 10:38

## 2021-11-25 RX ADMIN — ATORVASTATIN CALCIUM SCH MG: 40 TABLET, FILM COATED ORAL at 22:25

## 2021-11-25 RX ADMIN — HYDROXYZINE PAMOATE PRN MG: 25 CAPSULE ORAL at 10:35

## 2021-11-25 RX ADMIN — Medication SCH APPLIC: at 10:38

## 2021-11-25 RX ADMIN — SERTRALINE HYDROCHLORIDE SCH MG: 50 TABLET ORAL at 10:35

## 2021-11-25 RX ADMIN — MONTELUKAST SODIUM SCH MG: 10 TABLET, COATED ORAL at 22:25

## 2021-11-25 RX ADMIN — ASPIRIN 81 MG SCH MG: 81 TABLET ORAL at 10:35

## 2021-11-25 RX ADMIN — GABAPENTIN SCH MG: 100 CAPSULE ORAL at 14:56

## 2021-11-25 RX ADMIN — FAMOTIDINE SCH MG: 20 TABLET ORAL at 22:26

## 2021-11-25 RX ADMIN — TIOTROPIUM BROMIDE INHALATION SPRAY SCH PUFF: 3.12 SPRAY, METERED RESPIRATORY (INHALATION) at 10:36

## 2021-11-25 RX ADMIN — GABAPENTIN SCH MG: 100 CAPSULE ORAL at 05:59

## 2021-11-25 RX ADMIN — METHOCARBAMOL PRN MG: 500 TABLET ORAL at 10:35

## 2021-11-25 RX ADMIN — LATANOPROST SCH DROP: 50 SOLUTION OPHTHALMIC at 22:26

## 2021-11-26 VITALS — HEART RATE: 74 BPM | DIASTOLIC BLOOD PRESSURE: 62 MMHG | SYSTOLIC BLOOD PRESSURE: 125 MMHG | TEMPERATURE: 98.3 F

## 2021-11-26 RX ADMIN — BUDESONIDE AND FORMOTEROL FUMARATE DIHYDRATE SCH PUFF: 80; 4.5 AEROSOL RESPIRATORY (INHALATION) at 09:46

## 2021-11-26 RX ADMIN — GABAPENTIN SCH MG: 100 CAPSULE ORAL at 05:33

## 2021-11-26 RX ADMIN — HYDROXYZINE PAMOATE PRN MG: 25 CAPSULE ORAL at 09:46

## 2021-11-26 RX ADMIN — Medication SCH TAB: at 09:45

## 2021-11-26 RX ADMIN — SERTRALINE HYDROCHLORIDE SCH MG: 50 TABLET ORAL at 09:45

## 2021-11-26 RX ADMIN — ASPIRIN 81 MG SCH MG: 81 TABLET ORAL at 09:45

## 2022-04-25 ENCOUNTER — HOSPITAL ENCOUNTER (INPATIENT)
Dept: HOSPITAL 74 - YASAS | Age: 67
LOS: 4 days | Discharge: OTHER INSTITUTION | DRG: 897 | End: 2022-04-29
Attending: ALLERGY & IMMUNOLOGY | Admitting: ALLERGY & IMMUNOLOGY
Payer: COMMERCIAL

## 2022-04-25 VITALS — BODY MASS INDEX: 18.1 KG/M2

## 2022-04-25 DIAGNOSIS — M54.50: ICD-10-CM

## 2022-04-25 DIAGNOSIS — F10.24: ICD-10-CM

## 2022-04-25 DIAGNOSIS — G40.909: ICD-10-CM

## 2022-04-25 DIAGNOSIS — F10.230: Primary | ICD-10-CM

## 2022-04-25 DIAGNOSIS — F10.280: ICD-10-CM

## 2022-04-25 DIAGNOSIS — I10: ICD-10-CM

## 2022-04-25 DIAGNOSIS — J44.9: ICD-10-CM

## 2022-04-25 DIAGNOSIS — C34.92: ICD-10-CM

## 2022-04-25 DIAGNOSIS — F17.210: ICD-10-CM

## 2022-04-25 DIAGNOSIS — F32.A: ICD-10-CM

## 2022-04-25 DIAGNOSIS — Z99.89: ICD-10-CM

## 2022-04-25 DIAGNOSIS — Z88.0: ICD-10-CM

## 2022-04-25 DIAGNOSIS — M48.061: ICD-10-CM

## 2022-04-25 DIAGNOSIS — F10.282: ICD-10-CM

## 2022-04-25 PROCEDURE — U0005 INFEC AGEN DETEC AMPLI PROBE: HCPCS

## 2022-04-25 PROCEDURE — U0003 INFECTIOUS AGENT DETECTION BY NUCLEIC ACID (DNA OR RNA); SEVERE ACUTE RESPIRATORY SYNDROME CORONAVIRUS 2 (SARS-COV-2) (CORONAVIRUS DISEASE [COVID-19]), AMPLIFIED PROBE TECHNIQUE, MAKING USE OF HIGH THROUGHPUT TECHNOLOGIES AS DESCRIBED BY CMS-2020-01-R: HCPCS

## 2022-04-25 PROCEDURE — HZ2ZZZZ DETOXIFICATION SERVICES FOR SUBSTANCE ABUSE TREATMENT: ICD-10-PCS | Performed by: ALLERGY & IMMUNOLOGY

## 2022-04-25 RX ADMIN — LATANOPROST SCH: 50 SOLUTION OPHTHALMIC at 23:27

## 2022-04-25 RX ADMIN — BUDESONIDE AND FORMOTEROL FUMARATE DIHYDRATE SCH: 80; 4.5 AEROSOL RESPIRATORY (INHALATION) at 23:27

## 2022-04-25 RX ADMIN — Medication SCH MG: at 22:24

## 2022-04-25 RX ADMIN — GABAPENTIN SCH MG: 100 CAPSULE ORAL at 22:25

## 2022-04-25 RX ADMIN — HYDROXYZINE PAMOATE PRN MG: 25 CAPSULE ORAL at 18:41

## 2022-04-26 LAB
ALBUMIN SERPL-MCNC: 3.2 G/DL (ref 3.4–5)
ALP SERPL-CCNC: 99 U/L (ref 45–117)
ALT SERPL-CCNC: 29 U/L (ref 13–61)
ANION GAP SERPL CALC-SCNC: 7 MMOL/L (ref 8–16)
AST SERPL-CCNC: 27 U/L (ref 15–37)
BILIRUB SERPL-MCNC: 0.4 MG/DL (ref 0.2–1)
BUN SERPL-MCNC: 7.5 MG/DL (ref 7–18)
CALCIUM SERPL-MCNC: 9.2 MG/DL (ref 8.5–10.1)
CHLORIDE SERPL-SCNC: 103 MMOL/L (ref 98–107)
CO2 SERPL-SCNC: 25 MMOL/L (ref 21–32)
CREAT SERPL-MCNC: 0.6 MG/DL (ref 0.55–1.3)
DEPRECATED RDW RBC AUTO: 14 % (ref 11.6–15.6)
GLUCOSE SERPL-MCNC: 85 MG/DL (ref 74–106)
HCT VFR BLD CALC: 34.9 % (ref 32.4–45.2)
HGB BLD-MCNC: 11.5 GM/DL (ref 10.7–15.3)
MCH RBC QN AUTO: 29.3 PG (ref 25.7–33.7)
MCHC RBC AUTO-ENTMCNC: 33 G/DL (ref 32–36)
MCV RBC: 88.5 FL (ref 80–96)
PLATELET # BLD AUTO: 364 10^3/UL (ref 134–434)
PMV BLD: 8.4 FL (ref 7.5–11.1)
PROT SERPL-MCNC: 7.7 G/DL (ref 6.4–8.2)
RBC # BLD AUTO: 3.94 M/MM3 (ref 3.6–5.2)
SODIUM SERPL-SCNC: 135 MMOL/L (ref 136–145)
WBC # BLD AUTO: 5.2 K/MM3 (ref 4–10)

## 2022-04-26 RX ADMIN — NICOTINE SCH MG: 14 PATCH, EXTENDED RELEASE TRANSDERMAL at 10:19

## 2022-04-26 RX ADMIN — GABAPENTIN SCH MG: 100 CAPSULE ORAL at 22:28

## 2022-04-26 RX ADMIN — HYDROXYZINE PAMOATE PRN MG: 25 CAPSULE ORAL at 22:30

## 2022-04-26 RX ADMIN — FOLIC ACID SCH MG: 1 TABLET ORAL at 11:01

## 2022-04-26 RX ADMIN — HYDROXYZINE PAMOATE PRN MG: 25 CAPSULE ORAL at 18:15

## 2022-04-26 RX ADMIN — Medication SCH TAB: at 10:19

## 2022-04-26 RX ADMIN — BUDESONIDE AND FORMOTEROL FUMARATE DIHYDRATE SCH PUFF: 80; 4.5 AEROSOL RESPIRATORY (INHALATION) at 10:19

## 2022-04-26 RX ADMIN — ASPIRIN 81 MG SCH MG: 81 TABLET ORAL at 10:18

## 2022-04-26 RX ADMIN — SERTRALINE HYDROCHLORIDE SCH MG: 50 TABLET ORAL at 11:01

## 2022-04-26 RX ADMIN — GABAPENTIN SCH MG: 100 CAPSULE ORAL at 14:32

## 2022-04-26 RX ADMIN — TIOTROPIUM BROMIDE INHALATION SPRAY SCH PUFF: 3.12 SPRAY, METERED RESPIRATORY (INHALATION) at 10:19

## 2022-04-26 RX ADMIN — GABAPENTIN SCH MG: 100 CAPSULE ORAL at 05:23

## 2022-04-26 RX ADMIN — Medication PRN MG: at 22:28

## 2022-04-26 RX ADMIN — Medication SCH MG: at 22:28

## 2022-04-27 RX ADMIN — Medication SCH TAB: at 10:23

## 2022-04-27 RX ADMIN — FOLIC ACID SCH MG: 1 TABLET ORAL at 10:21

## 2022-04-27 RX ADMIN — BUDESONIDE AND FORMOTEROL FUMARATE DIHYDRATE SCH PUFF: 80; 4.5 AEROSOL RESPIRATORY (INHALATION) at 22:31

## 2022-04-27 RX ADMIN — LATANOPROST SCH: 50 SOLUTION OPHTHALMIC at 22:35

## 2022-04-27 RX ADMIN — HYDROXYZINE PAMOATE PRN MG: 25 CAPSULE ORAL at 18:17

## 2022-04-27 RX ADMIN — HYDROXYZINE PAMOATE PRN MG: 25 CAPSULE ORAL at 22:30

## 2022-04-27 RX ADMIN — ASPIRIN 81 MG SCH MG: 81 TABLET ORAL at 10:20

## 2022-04-27 RX ADMIN — BUDESONIDE AND FORMOTEROL FUMARATE DIHYDRATE SCH: 80; 4.5 AEROSOL RESPIRATORY (INHALATION) at 00:09

## 2022-04-27 RX ADMIN — SERTRALINE HYDROCHLORIDE SCH MG: 50 TABLET ORAL at 10:20

## 2022-04-27 RX ADMIN — Medication PRN MG: at 22:30

## 2022-04-27 RX ADMIN — GABAPENTIN SCH MG: 100 CAPSULE ORAL at 06:43

## 2022-04-27 RX ADMIN — BUDESONIDE AND FORMOTEROL FUMARATE DIHYDRATE SCH PUFF: 80; 4.5 AEROSOL RESPIRATORY (INHALATION) at 10:19

## 2022-04-27 RX ADMIN — GABAPENTIN SCH MG: 100 CAPSULE ORAL at 13:23

## 2022-04-27 RX ADMIN — NICOTINE SCH: 14 PATCH, EXTENDED RELEASE TRANSDERMAL at 10:23

## 2022-04-27 RX ADMIN — GABAPENTIN SCH MG: 100 CAPSULE ORAL at 22:30

## 2022-04-27 RX ADMIN — Medication SCH MG: at 22:30

## 2022-04-27 RX ADMIN — LATANOPROST SCH: 50 SOLUTION OPHTHALMIC at 00:09

## 2022-04-27 RX ADMIN — TIOTROPIUM BROMIDE INHALATION SPRAY SCH PUFF: 3.12 SPRAY, METERED RESPIRATORY (INHALATION) at 10:23

## 2022-04-28 RX ADMIN — Medication PRN MG: at 22:40

## 2022-04-28 RX ADMIN — AMLODIPINE BESYLATE SCH MG: 5 TABLET ORAL at 13:31

## 2022-04-28 RX ADMIN — BUDESONIDE AND FORMOTEROL FUMARATE DIHYDRATE SCH PUFF: 80; 4.5 AEROSOL RESPIRATORY (INHALATION) at 09:47

## 2022-04-28 RX ADMIN — BUDESONIDE AND FORMOTEROL FUMARATE DIHYDRATE SCH PUFF: 80; 4.5 AEROSOL RESPIRATORY (INHALATION) at 22:41

## 2022-04-28 RX ADMIN — ASPIRIN 81 MG SCH MG: 81 TABLET ORAL at 09:47

## 2022-04-28 RX ADMIN — HYDROXYZINE PAMOATE PRN MG: 25 CAPSULE ORAL at 09:47

## 2022-04-28 RX ADMIN — LATANOPROST SCH DROP: 50 SOLUTION OPHTHALMIC at 22:41

## 2022-04-28 RX ADMIN — NICOTINE SCH: 14 PATCH, EXTENDED RELEASE TRANSDERMAL at 09:47

## 2022-04-28 RX ADMIN — Medication SCH MG: at 22:41

## 2022-04-28 RX ADMIN — Medication SCH TAB: at 09:46

## 2022-04-28 RX ADMIN — HYDROXYZINE PAMOATE PRN MG: 25 CAPSULE ORAL at 22:40

## 2022-04-28 RX ADMIN — SERTRALINE HYDROCHLORIDE SCH MG: 50 TABLET ORAL at 09:47

## 2022-04-28 RX ADMIN — GABAPENTIN SCH MG: 100 CAPSULE ORAL at 22:40

## 2022-04-28 RX ADMIN — FOLIC ACID SCH MG: 1 TABLET ORAL at 09:46

## 2022-04-28 RX ADMIN — GABAPENTIN SCH MG: 100 CAPSULE ORAL at 06:09

## 2022-04-28 RX ADMIN — TIOTROPIUM BROMIDE INHALATION SPRAY SCH PUFF: 3.12 SPRAY, METERED RESPIRATORY (INHALATION) at 09:48

## 2022-04-28 RX ADMIN — GABAPENTIN SCH MG: 100 CAPSULE ORAL at 13:20

## 2022-04-29 ENCOUNTER — HOSPITAL ENCOUNTER (INPATIENT)
Dept: HOSPITAL 74 - YASAS | Age: 67
LOS: 19 days | Discharge: HOME | DRG: 951 | End: 2022-05-18
Attending: ALLERGY & IMMUNOLOGY | Admitting: ALLERGY & IMMUNOLOGY
Payer: COMMERCIAL

## 2022-04-29 VITALS — HEART RATE: 100 BPM | DIASTOLIC BLOOD PRESSURE: 67 MMHG | SYSTOLIC BLOOD PRESSURE: 120 MMHG | TEMPERATURE: 97.1 F

## 2022-04-29 DIAGNOSIS — R63.4: ICD-10-CM

## 2022-04-29 DIAGNOSIS — J44.9: ICD-10-CM

## 2022-04-29 DIAGNOSIS — H40.9: ICD-10-CM

## 2022-04-29 DIAGNOSIS — G40.909: ICD-10-CM

## 2022-04-29 DIAGNOSIS — M14.60: ICD-10-CM

## 2022-04-29 DIAGNOSIS — I10: ICD-10-CM

## 2022-04-29 DIAGNOSIS — Z88.0: ICD-10-CM

## 2022-04-29 DIAGNOSIS — M48.00: ICD-10-CM

## 2022-04-29 DIAGNOSIS — Z99.89: ICD-10-CM

## 2022-04-29 DIAGNOSIS — L85.3: ICD-10-CM

## 2022-04-29 DIAGNOSIS — F10.20: ICD-10-CM

## 2022-04-29 DIAGNOSIS — E78.5: ICD-10-CM

## 2022-04-29 DIAGNOSIS — F17.210: Primary | ICD-10-CM

## 2022-04-29 PROCEDURE — U0003 INFECTIOUS AGENT DETECTION BY NUCLEIC ACID (DNA OR RNA); SEVERE ACUTE RESPIRATORY SYNDROME CORONAVIRUS 2 (SARS-COV-2) (CORONAVIRUS DISEASE [COVID-19]), AMPLIFIED PROBE TECHNIQUE, MAKING USE OF HIGH THROUGHPUT TECHNOLOGIES AS DESCRIBED BY CMS-2020-01-R: HCPCS

## 2022-04-29 PROCEDURE — U0005 INFEC AGEN DETEC AMPLI PROBE: HCPCS

## 2022-04-29 PROCEDURE — HZ42ZZZ GROUP COUNSELING FOR SUBSTANCE ABUSE TREATMENT, COGNITIVE-BEHAVIORAL: ICD-10-PCS | Performed by: ALLERGY & IMMUNOLOGY

## 2022-04-29 RX ADMIN — ASPIRIN 81 MG SCH MG: 81 TABLET ORAL at 09:49

## 2022-04-29 RX ADMIN — NICOTINE SCH: 14 PATCH, EXTENDED RELEASE TRANSDERMAL at 09:49

## 2022-04-29 RX ADMIN — FOLIC ACID SCH MG: 1 TABLET ORAL at 09:50

## 2022-04-29 RX ADMIN — HYDROXYZINE PAMOATE PRN MG: 25 CAPSULE ORAL at 09:49

## 2022-04-29 RX ADMIN — SERTRALINE HYDROCHLORIDE SCH MG: 50 TABLET ORAL at 09:49

## 2022-04-29 RX ADMIN — LATANOPROST SCH DROP: 50 SOLUTION OPHTHALMIC at 22:05

## 2022-04-29 RX ADMIN — Medication SCH TAB: at 09:48

## 2022-04-29 RX ADMIN — GABAPENTIN SCH MG: 100 CAPSULE ORAL at 05:24

## 2022-04-29 RX ADMIN — Medication SCH MG: at 22:05

## 2022-04-29 RX ADMIN — BUDESONIDE AND FORMOTEROL FUMARATE DIHYDRATE SCH PUFF: 80; 4.5 AEROSOL RESPIRATORY (INHALATION) at 09:49

## 2022-04-29 RX ADMIN — AMLODIPINE BESYLATE SCH MG: 5 TABLET ORAL at 09:49

## 2022-04-29 RX ADMIN — TIOTROPIUM BROMIDE INHALATION SPRAY SCH PUFF: 3.12 SPRAY, METERED RESPIRATORY (INHALATION) at 09:50

## 2022-04-29 RX ADMIN — GABAPENTIN SCH MG: 100 CAPSULE ORAL at 22:05

## 2022-04-29 RX ADMIN — GABAPENTIN SCH MG: 100 CAPSULE ORAL at 14:55

## 2022-04-30 RX ADMIN — TIOTROPIUM BROMIDE INHALATION SPRAY SCH PUFF: 3.12 SPRAY, METERED RESPIRATORY (INHALATION) at 10:23

## 2022-04-30 RX ADMIN — ASPIRIN 81 MG SCH MG: 81 TABLET ORAL at 10:22

## 2022-04-30 RX ADMIN — Medication SCH MG: at 21:57

## 2022-04-30 RX ADMIN — GABAPENTIN SCH MG: 100 CAPSULE ORAL at 06:41

## 2022-04-30 RX ADMIN — Medication SCH TAB: at 10:23

## 2022-04-30 RX ADMIN — SERTRALINE HYDROCHLORIDE SCH MG: 50 TABLET ORAL at 10:24

## 2022-04-30 RX ADMIN — LATANOPROST SCH DROP: 50 SOLUTION OPHTHALMIC at 21:58

## 2022-04-30 RX ADMIN — NICOTINE SCH MG: 7 PATCH TRANSDERMAL at 10:22

## 2022-04-30 RX ADMIN — GABAPENTIN SCH MG: 100 CAPSULE ORAL at 21:57

## 2022-04-30 RX ADMIN — GABAPENTIN SCH MG: 100 CAPSULE ORAL at 13:19

## 2022-04-30 RX ADMIN — Medication SCH MG: at 21:58

## 2022-05-01 RX ADMIN — GABAPENTIN SCH MG: 100 CAPSULE ORAL at 06:41

## 2022-05-01 RX ADMIN — Medication SCH TAB: at 10:22

## 2022-05-01 RX ADMIN — LATANOPROST SCH: 50 SOLUTION OPHTHALMIC at 22:08

## 2022-05-01 RX ADMIN — NICOTINE SCH MG: 7 PATCH TRANSDERMAL at 10:25

## 2022-05-01 RX ADMIN — GABAPENTIN SCH MG: 100 CAPSULE ORAL at 14:29

## 2022-05-01 RX ADMIN — Medication SCH MG: at 22:07

## 2022-05-01 RX ADMIN — ASPIRIN 81 MG SCH MG: 81 TABLET ORAL at 10:23

## 2022-05-01 RX ADMIN — SERTRALINE HYDROCHLORIDE SCH MG: 50 TABLET ORAL at 10:23

## 2022-05-01 RX ADMIN — HYDROXYZINE PAMOATE PRN MG: 25 CAPSULE ORAL at 22:08

## 2022-05-01 RX ADMIN — TIOTROPIUM BROMIDE INHALATION SPRAY SCH PUFF: 3.12 SPRAY, METERED RESPIRATORY (INHALATION) at 10:22

## 2022-05-01 RX ADMIN — GABAPENTIN SCH MG: 100 CAPSULE ORAL at 22:07

## 2022-05-02 RX ADMIN — GABAPENTIN SCH MG: 100 CAPSULE ORAL at 21:13

## 2022-05-02 RX ADMIN — NICOTINE PRN MG: 4 INHALANT RESPIRATORY (INHALATION) at 08:59

## 2022-05-02 RX ADMIN — NICOTINE PRN MG: 4 INHALANT RESPIRATORY (INHALATION) at 21:16

## 2022-05-02 RX ADMIN — GABAPENTIN SCH MG: 100 CAPSULE ORAL at 06:22

## 2022-05-02 RX ADMIN — ASPIRIN 81 MG SCH MG: 81 TABLET ORAL at 10:42

## 2022-05-02 RX ADMIN — NICOTINE SCH MG: 7 PATCH TRANSDERMAL at 10:44

## 2022-05-02 RX ADMIN — Medication SCH MG: at 21:13

## 2022-05-02 RX ADMIN — Medication SCH TAB: at 10:42

## 2022-05-02 RX ADMIN — TIOTROPIUM BROMIDE INHALATION SPRAY SCH PUFF: 3.12 SPRAY, METERED RESPIRATORY (INHALATION) at 10:43

## 2022-05-02 RX ADMIN — LATANOPROST SCH DROP: 50 SOLUTION OPHTHALMIC at 21:14

## 2022-05-02 RX ADMIN — SERTRALINE HYDROCHLORIDE SCH MG: 50 TABLET ORAL at 10:42

## 2022-05-02 RX ADMIN — GABAPENTIN SCH MG: 100 CAPSULE ORAL at 13:12

## 2022-05-03 RX ADMIN — GABAPENTIN SCH MG: 100 CAPSULE ORAL at 14:26

## 2022-05-03 RX ADMIN — GABAPENTIN SCH MG: 100 CAPSULE ORAL at 06:24

## 2022-05-03 RX ADMIN — Medication SCH MG: at 21:26

## 2022-05-03 RX ADMIN — NICOTINE SCH MG: 7 PATCH TRANSDERMAL at 10:34

## 2022-05-03 RX ADMIN — TIOTROPIUM BROMIDE INHALATION SPRAY SCH PUFF: 3.12 SPRAY, METERED RESPIRATORY (INHALATION) at 10:35

## 2022-05-03 RX ADMIN — GABAPENTIN SCH MG: 100 CAPSULE ORAL at 21:26

## 2022-05-03 RX ADMIN — Medication SCH TAB: at 10:35

## 2022-05-03 RX ADMIN — LATANOPROST SCH DROP: 50 SOLUTION OPHTHALMIC at 21:26

## 2022-05-03 RX ADMIN — ASPIRIN 81 MG SCH MG: 81 TABLET ORAL at 10:34

## 2022-05-03 RX ADMIN — SERTRALINE HYDROCHLORIDE SCH MG: 50 TABLET ORAL at 10:35

## 2022-05-04 RX ADMIN — LATANOPROST SCH DROP: 50 SOLUTION OPHTHALMIC at 21:13

## 2022-05-04 RX ADMIN — TIOTROPIUM BROMIDE INHALATION SPRAY SCH: 3.12 SPRAY, METERED RESPIRATORY (INHALATION) at 10:36

## 2022-05-04 RX ADMIN — Medication SCH MG: at 21:14

## 2022-05-04 RX ADMIN — NICOTINE SCH MG: 7 PATCH TRANSDERMAL at 10:35

## 2022-05-04 RX ADMIN — GABAPENTIN SCH MG: 100 CAPSULE ORAL at 21:14

## 2022-05-04 RX ADMIN — Medication SCH TAB: at 10:33

## 2022-05-04 RX ADMIN — GABAPENTIN SCH MG: 100 CAPSULE ORAL at 14:25

## 2022-05-04 RX ADMIN — GABAPENTIN SCH MG: 100 CAPSULE ORAL at 06:54

## 2022-05-04 RX ADMIN — ASPIRIN 81 MG SCH MG: 81 TABLET ORAL at 10:32

## 2022-05-04 RX ADMIN — SERTRALINE HYDROCHLORIDE SCH MG: 50 TABLET ORAL at 10:32

## 2022-05-05 RX ADMIN — Medication SCH MG: at 21:19

## 2022-05-05 RX ADMIN — LATANOPROST SCH DROP: 50 SOLUTION OPHTHALMIC at 21:19

## 2022-05-05 RX ADMIN — TIOTROPIUM BROMIDE INHALATION SPRAY SCH PUFF: 3.12 SPRAY, METERED RESPIRATORY (INHALATION) at 10:47

## 2022-05-05 RX ADMIN — Medication SCH MG: at 21:20

## 2022-05-05 RX ADMIN — GABAPENTIN SCH MG: 100 CAPSULE ORAL at 07:27

## 2022-05-05 RX ADMIN — GUAIFENESIN PRN ML: 100 SOLUTION ORAL at 21:20

## 2022-05-05 RX ADMIN — NICOTINE SCH MG: 7 PATCH TRANSDERMAL at 10:45

## 2022-05-05 RX ADMIN — ASPIRIN 81 MG SCH MG: 81 TABLET ORAL at 10:44

## 2022-05-05 RX ADMIN — Medication SCH TAB: at 10:44

## 2022-05-05 RX ADMIN — GABAPENTIN SCH MG: 100 CAPSULE ORAL at 14:06

## 2022-05-05 RX ADMIN — GABAPENTIN SCH MG: 100 CAPSULE ORAL at 21:19

## 2022-05-05 RX ADMIN — SERTRALINE HYDROCHLORIDE SCH MG: 50 TABLET ORAL at 10:44

## 2022-05-06 RX ADMIN — Medication SCH MG: at 21:35

## 2022-05-06 RX ADMIN — GABAPENTIN SCH MG: 100 CAPSULE ORAL at 06:16

## 2022-05-06 RX ADMIN — LATANOPROST SCH DROP: 50 SOLUTION OPHTHALMIC at 21:37

## 2022-05-06 RX ADMIN — Medication SCH MG: at 21:36

## 2022-05-06 RX ADMIN — SERTRALINE HYDROCHLORIDE SCH MG: 50 TABLET ORAL at 10:46

## 2022-05-06 RX ADMIN — Medication SCH APPLIC: at 12:46

## 2022-05-06 RX ADMIN — Medication SCH TAB: at 10:48

## 2022-05-06 RX ADMIN — GABAPENTIN SCH MG: 100 CAPSULE ORAL at 21:36

## 2022-05-06 RX ADMIN — NICOTINE SCH MG: 7 PATCH TRANSDERMAL at 10:48

## 2022-05-06 RX ADMIN — ASPIRIN 81 MG SCH MG: 81 TABLET ORAL at 10:46

## 2022-05-06 RX ADMIN — GABAPENTIN SCH MG: 100 CAPSULE ORAL at 14:30

## 2022-05-06 RX ADMIN — TIOTROPIUM BROMIDE INHALATION SPRAY SCH PUFF: 3.12 SPRAY, METERED RESPIRATORY (INHALATION) at 10:49

## 2022-05-06 RX ADMIN — ALBUTEROL SULFATE PRN UNITS: 90 AEROSOL, METERED RESPIRATORY (INHALATION) at 10:45

## 2022-05-07 RX ADMIN — Medication SCH: at 11:09

## 2022-05-07 RX ADMIN — GABAPENTIN SCH MG: 100 CAPSULE ORAL at 21:08

## 2022-05-07 RX ADMIN — NICOTINE SCH MG: 7 PATCH TRANSDERMAL at 11:07

## 2022-05-07 RX ADMIN — GABAPENTIN SCH MG: 100 CAPSULE ORAL at 06:44

## 2022-05-07 RX ADMIN — SERTRALINE HYDROCHLORIDE SCH MG: 50 TABLET ORAL at 11:06

## 2022-05-07 RX ADMIN — LATANOPROST SCH DROP: 50 SOLUTION OPHTHALMIC at 21:09

## 2022-05-07 RX ADMIN — TIOTROPIUM BROMIDE INHALATION SPRAY SCH: 3.12 SPRAY, METERED RESPIRATORY (INHALATION) at 11:07

## 2022-05-07 RX ADMIN — ASPIRIN 81 MG SCH MG: 81 TABLET ORAL at 11:06

## 2022-05-07 RX ADMIN — Medication SCH MG: at 21:08

## 2022-05-07 RX ADMIN — GABAPENTIN SCH MG: 100 CAPSULE ORAL at 14:07

## 2022-05-07 RX ADMIN — HYDROXYZINE PAMOATE PRN MG: 25 CAPSULE ORAL at 06:44

## 2022-05-07 RX ADMIN — Medication SCH TAB: at 11:07

## 2022-05-08 RX ADMIN — LATANOPROST SCH DROP: 50 SOLUTION OPHTHALMIC at 21:15

## 2022-05-08 RX ADMIN — Medication SCH TAB: at 10:49

## 2022-05-08 RX ADMIN — ASPIRIN 81 MG SCH MG: 81 TABLET ORAL at 10:49

## 2022-05-08 RX ADMIN — ACETAMINOPHEN PRN MG: 325 TABLET ORAL at 21:14

## 2022-05-08 RX ADMIN — Medication SCH MG: at 21:15

## 2022-05-08 RX ADMIN — GABAPENTIN SCH MG: 100 CAPSULE ORAL at 06:22

## 2022-05-08 RX ADMIN — GABAPENTIN SCH MG: 100 CAPSULE ORAL at 21:15

## 2022-05-08 RX ADMIN — SERTRALINE HYDROCHLORIDE SCH MG: 50 TABLET ORAL at 10:49

## 2022-05-08 RX ADMIN — TIOTROPIUM BROMIDE INHALATION SPRAY SCH PUFF: 3.12 SPRAY, METERED RESPIRATORY (INHALATION) at 10:53

## 2022-05-08 RX ADMIN — GABAPENTIN SCH MG: 100 CAPSULE ORAL at 13:27

## 2022-05-08 RX ADMIN — Medication SCH APPLIC: at 10:51

## 2022-05-08 RX ADMIN — NICOTINE SCH MG: 7 PATCH TRANSDERMAL at 10:49

## 2022-05-09 RX ADMIN — NICOTINE SCH MG: 7 PATCH TRANSDERMAL at 10:53

## 2022-05-09 RX ADMIN — ASPIRIN 81 MG SCH MG: 81 TABLET ORAL at 10:54

## 2022-05-09 RX ADMIN — ACETAMINOPHEN PRN MG: 325 TABLET ORAL at 06:23

## 2022-05-09 RX ADMIN — GABAPENTIN SCH MG: 100 CAPSULE ORAL at 21:34

## 2022-05-09 RX ADMIN — SERTRALINE HYDROCHLORIDE SCH MG: 50 TABLET ORAL at 10:54

## 2022-05-09 RX ADMIN — Medication SCH MG: at 21:34

## 2022-05-09 RX ADMIN — Medication SCH TAB: at 10:53

## 2022-05-09 RX ADMIN — GABAPENTIN SCH MG: 100 CAPSULE ORAL at 14:12

## 2022-05-09 RX ADMIN — GABAPENTIN SCH MG: 100 CAPSULE ORAL at 06:21

## 2022-05-09 RX ADMIN — TIOTROPIUM BROMIDE INHALATION SPRAY SCH PUFF: 3.12 SPRAY, METERED RESPIRATORY (INHALATION) at 10:53

## 2022-05-09 RX ADMIN — Medication SCH: at 10:56

## 2022-05-09 RX ADMIN — LATANOPROST SCH DROP: 50 SOLUTION OPHTHALMIC at 21:33

## 2022-05-10 RX ADMIN — HYDROXYZINE PAMOATE PRN MG: 25 CAPSULE ORAL at 06:23

## 2022-05-10 RX ADMIN — Medication SCH MG: at 21:23

## 2022-05-10 RX ADMIN — Medication SCH TAB: at 10:39

## 2022-05-10 RX ADMIN — TIOTROPIUM BROMIDE INHALATION SPRAY SCH PUFF: 3.12 SPRAY, METERED RESPIRATORY (INHALATION) at 10:39

## 2022-05-10 RX ADMIN — GABAPENTIN SCH MG: 100 CAPSULE ORAL at 14:18

## 2022-05-10 RX ADMIN — ASPIRIN 81 MG SCH MG: 81 TABLET ORAL at 10:39

## 2022-05-10 RX ADMIN — Medication SCH: at 10:41

## 2022-05-10 RX ADMIN — Medication SCH MG: at 21:24

## 2022-05-10 RX ADMIN — NICOTINE SCH MG: 7 PATCH TRANSDERMAL at 10:41

## 2022-05-10 RX ADMIN — GABAPENTIN SCH MG: 100 CAPSULE ORAL at 21:24

## 2022-05-10 RX ADMIN — GABAPENTIN SCH MG: 100 CAPSULE ORAL at 06:23

## 2022-05-10 RX ADMIN — LATANOPROST SCH DROP: 50 SOLUTION OPHTHALMIC at 21:25

## 2022-05-10 RX ADMIN — SERTRALINE HYDROCHLORIDE SCH MG: 50 TABLET ORAL at 10:39

## 2022-05-11 RX ADMIN — SERTRALINE HYDROCHLORIDE SCH MG: 50 TABLET ORAL at 10:31

## 2022-05-11 RX ADMIN — Medication SCH MG: at 21:25

## 2022-05-11 RX ADMIN — Medication SCH TAB: at 10:31

## 2022-05-11 RX ADMIN — GABAPENTIN SCH MG: 100 CAPSULE ORAL at 21:25

## 2022-05-11 RX ADMIN — GABAPENTIN SCH MG: 100 CAPSULE ORAL at 13:31

## 2022-05-11 RX ADMIN — Medication SCH APPLIC: at 10:36

## 2022-05-11 RX ADMIN — TIOTROPIUM BROMIDE INHALATION SPRAY SCH: 3.12 SPRAY, METERED RESPIRATORY (INHALATION) at 11:04

## 2022-05-11 RX ADMIN — ASPIRIN 81 MG SCH MG: 81 TABLET ORAL at 10:31

## 2022-05-11 RX ADMIN — NICOTINE SCH MG: 7 PATCH TRANSDERMAL at 10:32

## 2022-05-11 RX ADMIN — LATANOPROST SCH DROP: 50 SOLUTION OPHTHALMIC at 21:25

## 2022-05-11 RX ADMIN — GABAPENTIN SCH MG: 100 CAPSULE ORAL at 06:27

## 2022-05-12 RX ADMIN — TIOTROPIUM BROMIDE INHALATION SPRAY SCH PUFF: 3.12 SPRAY, METERED RESPIRATORY (INHALATION) at 10:37

## 2022-05-12 RX ADMIN — Medication SCH TAB: at 10:40

## 2022-05-12 RX ADMIN — GABAPENTIN SCH MG: 100 CAPSULE ORAL at 21:15

## 2022-05-12 RX ADMIN — Medication SCH: at 10:40

## 2022-05-12 RX ADMIN — SERTRALINE HYDROCHLORIDE SCH MG: 50 TABLET ORAL at 10:38

## 2022-05-12 RX ADMIN — GABAPENTIN SCH MG: 100 CAPSULE ORAL at 15:51

## 2022-05-12 RX ADMIN — LATANOPROST SCH DROP: 50 SOLUTION OPHTHALMIC at 21:15

## 2022-05-12 RX ADMIN — ASPIRIN 81 MG SCH MG: 81 TABLET ORAL at 10:38

## 2022-05-12 RX ADMIN — NICOTINE SCH MG: 7 PATCH TRANSDERMAL at 10:41

## 2022-05-12 RX ADMIN — GABAPENTIN SCH MG: 100 CAPSULE ORAL at 06:13

## 2022-05-12 RX ADMIN — Medication SCH MG: at 21:15

## 2022-05-13 RX ADMIN — TIOTROPIUM BROMIDE INHALATION SPRAY SCH PUFF: 3.12 SPRAY, METERED RESPIRATORY (INHALATION) at 10:35

## 2022-05-13 RX ADMIN — ASPIRIN 81 MG SCH MG: 81 TABLET ORAL at 10:34

## 2022-05-13 RX ADMIN — GABAPENTIN SCH MG: 100 CAPSULE ORAL at 06:40

## 2022-05-13 RX ADMIN — LATANOPROST SCH: 50 SOLUTION OPHTHALMIC at 21:17

## 2022-05-13 RX ADMIN — GABAPENTIN SCH MG: 100 CAPSULE ORAL at 13:24

## 2022-05-13 RX ADMIN — ACETAMINOPHEN PRN MG: 325 TABLET ORAL at 23:11

## 2022-05-13 RX ADMIN — Medication SCH: at 10:34

## 2022-05-13 RX ADMIN — Medication SCH MG: at 21:16

## 2022-05-13 RX ADMIN — NICOTINE SCH: 7 PATCH TRANSDERMAL at 10:33

## 2022-05-13 RX ADMIN — SERTRALINE HYDROCHLORIDE SCH MG: 50 TABLET ORAL at 10:34

## 2022-05-13 RX ADMIN — Medication SCH TAB: at 10:33

## 2022-05-13 RX ADMIN — GABAPENTIN SCH MG: 100 CAPSULE ORAL at 21:16

## 2022-05-14 RX ADMIN — LATANOPROST SCH: 50 SOLUTION OPHTHALMIC at 21:58

## 2022-05-14 RX ADMIN — Medication SCH MG: at 21:57

## 2022-05-14 RX ADMIN — ALBUTEROL SULFATE PRN PUFF: 90 AEROSOL, METERED RESPIRATORY (INHALATION) at 22:01

## 2022-05-14 RX ADMIN — GABAPENTIN SCH MG: 100 CAPSULE ORAL at 06:24

## 2022-05-14 RX ADMIN — GABAPENTIN SCH MG: 100 CAPSULE ORAL at 21:57

## 2022-05-14 RX ADMIN — Medication SCH: at 10:58

## 2022-05-14 RX ADMIN — GABAPENTIN SCH MG: 100 CAPSULE ORAL at 14:14

## 2022-05-14 RX ADMIN — ASPIRIN 81 MG SCH MG: 81 TABLET ORAL at 10:56

## 2022-05-14 RX ADMIN — SERTRALINE HYDROCHLORIDE SCH MG: 50 TABLET ORAL at 10:56

## 2022-05-14 RX ADMIN — NICOTINE SCH: 7 PATCH TRANSDERMAL at 10:56

## 2022-05-14 RX ADMIN — Medication SCH TAB: at 10:56

## 2022-05-14 RX ADMIN — TIOTROPIUM BROMIDE INHALATION SPRAY SCH PUFF: 3.12 SPRAY, METERED RESPIRATORY (INHALATION) at 10:55

## 2022-05-15 RX ADMIN — SERTRALINE HYDROCHLORIDE SCH MG: 50 TABLET ORAL at 10:34

## 2022-05-15 RX ADMIN — GABAPENTIN SCH MG: 100 CAPSULE ORAL at 06:44

## 2022-05-15 RX ADMIN — GUAIFENESIN PRN ML: 100 SOLUTION ORAL at 21:12

## 2022-05-15 RX ADMIN — NICOTINE SCH: 7 PATCH TRANSDERMAL at 10:34

## 2022-05-15 RX ADMIN — ALBUTEROL SULFATE PRN PUFF: 90 AEROSOL, METERED RESPIRATORY (INHALATION) at 10:35

## 2022-05-15 RX ADMIN — Medication SCH MG: at 21:10

## 2022-05-15 RX ADMIN — GABAPENTIN SCH MG: 100 CAPSULE ORAL at 21:09

## 2022-05-15 RX ADMIN — TIOTROPIUM BROMIDE INHALATION SPRAY SCH PUFF: 3.12 SPRAY, METERED RESPIRATORY (INHALATION) at 14:04

## 2022-05-15 RX ADMIN — ASPIRIN 81 MG SCH MG: 81 TABLET ORAL at 10:34

## 2022-05-15 RX ADMIN — GUAIFENESIN PRN ML: 100 SOLUTION ORAL at 10:38

## 2022-05-15 RX ADMIN — GABAPENTIN SCH MG: 100 CAPSULE ORAL at 14:04

## 2022-05-15 RX ADMIN — LATANOPROST SCH DROP: 50 SOLUTION OPHTHALMIC at 21:10

## 2022-05-15 RX ADMIN — Medication SCH TAB: at 10:34

## 2022-05-15 RX ADMIN — Medication SCH MG: at 21:09

## 2022-05-15 RX ADMIN — Medication SCH APPLIC: at 10:35

## 2022-05-16 RX ADMIN — Medication SCH MG: at 21:12

## 2022-05-16 RX ADMIN — Medication SCH TAB: at 11:08

## 2022-05-16 RX ADMIN — NICOTINE SCH: 7 PATCH TRANSDERMAL at 11:07

## 2022-05-16 RX ADMIN — SERTRALINE HYDROCHLORIDE SCH MG: 50 TABLET ORAL at 11:08

## 2022-05-16 RX ADMIN — TIOTROPIUM BROMIDE INHALATION SPRAY SCH PUFF: 3.12 SPRAY, METERED RESPIRATORY (INHALATION) at 11:08

## 2022-05-16 RX ADMIN — LATANOPROST SCH: 50 SOLUTION OPHTHALMIC at 21:12

## 2022-05-16 RX ADMIN — GUAIFENESIN PRN ML: 100 SOLUTION ORAL at 21:14

## 2022-05-16 RX ADMIN — GABAPENTIN SCH MG: 100 CAPSULE ORAL at 06:23

## 2022-05-16 RX ADMIN — GABAPENTIN SCH MG: 100 CAPSULE ORAL at 14:14

## 2022-05-16 RX ADMIN — Medication SCH APPLIC: at 11:07

## 2022-05-16 RX ADMIN — GABAPENTIN SCH MG: 100 CAPSULE ORAL at 21:12

## 2022-05-16 RX ADMIN — ASPIRIN 81 MG SCH MG: 81 TABLET ORAL at 11:07

## 2022-05-17 RX ADMIN — GABAPENTIN SCH MG: 100 CAPSULE ORAL at 06:48

## 2022-05-17 RX ADMIN — Medication SCH: at 10:57

## 2022-05-17 RX ADMIN — LATANOPROST SCH: 50 SOLUTION OPHTHALMIC at 21:24

## 2022-05-17 RX ADMIN — Medication SCH MG: at 21:21

## 2022-05-17 RX ADMIN — Medication SCH TAB: at 10:56

## 2022-05-17 RX ADMIN — GABAPENTIN SCH MG: 100 CAPSULE ORAL at 21:23

## 2022-05-17 RX ADMIN — Medication SCH MG: at 21:23

## 2022-05-17 RX ADMIN — SERTRALINE HYDROCHLORIDE SCH MG: 50 TABLET ORAL at 10:56

## 2022-05-17 RX ADMIN — ASPIRIN 81 MG SCH MG: 81 TABLET ORAL at 10:56

## 2022-05-17 RX ADMIN — TIOTROPIUM BROMIDE INHALATION SPRAY SCH PUFF: 3.12 SPRAY, METERED RESPIRATORY (INHALATION) at 10:57

## 2022-05-17 RX ADMIN — NICOTINE SCH: 7 PATCH TRANSDERMAL at 10:56

## 2022-05-17 RX ADMIN — GABAPENTIN SCH MG: 100 CAPSULE ORAL at 13:18

## 2022-05-18 VITALS — SYSTOLIC BLOOD PRESSURE: 113 MMHG | DIASTOLIC BLOOD PRESSURE: 58 MMHG | TEMPERATURE: 97.3 F | HEART RATE: 84 BPM

## 2022-05-18 RX ADMIN — HYDROXYZINE PAMOATE PRN MG: 25 CAPSULE ORAL at 06:14

## 2022-05-18 RX ADMIN — GABAPENTIN SCH MG: 100 CAPSULE ORAL at 06:14

## 2022-05-18 RX ADMIN — TIOTROPIUM BROMIDE INHALATION SPRAY SCH PUFF: 3.12 SPRAY, METERED RESPIRATORY (INHALATION) at 10:48

## 2022-05-18 RX ADMIN — Medication SCH TAB: at 10:44

## 2022-05-18 RX ADMIN — GABAPENTIN SCH MG: 100 CAPSULE ORAL at 13:28

## 2022-05-18 RX ADMIN — SERTRALINE HYDROCHLORIDE SCH MG: 50 TABLET ORAL at 10:45

## 2022-05-18 RX ADMIN — NICOTINE SCH: 7 PATCH TRANSDERMAL at 10:48

## 2022-05-18 RX ADMIN — ASPIRIN 81 MG SCH MG: 81 TABLET ORAL at 10:45

## 2022-05-18 RX ADMIN — Medication SCH APPLIC: at 10:48

## 2023-03-24 ENCOUNTER — HOSPITAL ENCOUNTER (INPATIENT)
Dept: HOSPITAL 74 - YASAS | Age: 68
LOS: 2 days | Discharge: HOME | DRG: 897 | End: 2023-03-26
Attending: SURGERY | Admitting: ALLERGY & IMMUNOLOGY
Payer: COMMERCIAL

## 2023-03-24 VITALS — BODY MASS INDEX: 19 KG/M2

## 2023-03-24 DIAGNOSIS — Z85.118: ICD-10-CM

## 2023-03-24 DIAGNOSIS — F10.282: ICD-10-CM

## 2023-03-24 DIAGNOSIS — F10.230: Primary | ICD-10-CM

## 2023-03-24 DIAGNOSIS — R79.89: ICD-10-CM

## 2023-03-24 DIAGNOSIS — I10: ICD-10-CM

## 2023-03-24 DIAGNOSIS — R63.4: ICD-10-CM

## 2023-03-24 DIAGNOSIS — G40.909: ICD-10-CM

## 2023-03-24 DIAGNOSIS — M48.00: ICD-10-CM

## 2023-03-24 DIAGNOSIS — F32.9: ICD-10-CM

## 2023-03-24 DIAGNOSIS — J45.20: ICD-10-CM

## 2023-03-24 DIAGNOSIS — F17.210: ICD-10-CM

## 2023-03-24 DIAGNOSIS — Z99.89: ICD-10-CM

## 2023-03-24 DIAGNOSIS — Z88.0: ICD-10-CM

## 2023-03-24 DIAGNOSIS — J43.9: ICD-10-CM

## 2023-03-24 DIAGNOSIS — E78.5: ICD-10-CM

## 2023-03-24 LAB
ALBUMIN SERPL-MCNC: 3.7 G/DL (ref 3.4–5)
ALP SERPL-CCNC: 111 U/L (ref 45–117)
ALT SERPL-CCNC: 47 U/L (ref 13–61)
ANION GAP SERPL CALC-SCNC: 8 MMOL/L (ref 8–16)
AST SERPL-CCNC: 74 U/L (ref 15–37)
BILIRUB SERPL-MCNC: 0.5 MG/DL (ref 0.2–1)
BUN SERPL-MCNC: 3.9 MG/DL (ref 7–18)
CALCIUM SERPL-MCNC: 9.5 MG/DL (ref 8.5–10.1)
CHLORIDE SERPL-SCNC: 92 MMOL/L (ref 98–107)
CO2 SERPL-SCNC: 26 MMOL/L (ref 21–32)
CREAT SERPL-MCNC: 0.7 MG/DL (ref 0.55–1.3)
DEPRECATED RDW RBC AUTO: 14.5 % (ref 11.6–15.6)
GLUCOSE SERPL-MCNC: 82 MG/DL (ref 74–106)
HCT VFR BLD CALC: 35.7 % (ref 32.4–45.2)
HGB BLD-MCNC: 12.4 GM/DL (ref 10.7–15.3)
MCH RBC QN AUTO: 31.1 PG (ref 25.7–33.7)
MCHC RBC AUTO-ENTMCNC: 34.8 G/DL (ref 32–36)
MCV RBC: 89.3 FL (ref 80–96)
PLATELET # BLD AUTO: 264 10^3/UL (ref 134–434)
PMV BLD: 9.3 FL (ref 7.5–11.1)
PROT SERPL-MCNC: 8.8 G/DL (ref 6.4–8.2)
RBC # BLD AUTO: 4 M/MM3 (ref 3.6–5.2)
SODIUM SERPL-SCNC: 127 MMOL/L (ref 136–145)
WBC # BLD AUTO: 5.9 K/MM3 (ref 4–10)

## 2023-03-24 PROCEDURE — HZ2ZZZZ DETOXIFICATION SERVICES FOR SUBSTANCE ABUSE TREATMENT: ICD-10-PCS | Performed by: SURGERY

## 2023-03-24 PROCEDURE — U0005 INFEC AGEN DETEC AMPLI PROBE: HCPCS

## 2023-03-24 PROCEDURE — U0003 INFECTIOUS AGENT DETECTION BY NUCLEIC ACID (DNA OR RNA); SEVERE ACUTE RESPIRATORY SYNDROME CORONAVIRUS 2 (SARS-COV-2) (CORONAVIRUS DISEASE [COVID-19]), AMPLIFIED PROBE TECHNIQUE, MAKING USE OF HIGH THROUGHPUT TECHNOLOGIES AS DESCRIBED BY CMS-2020-01-R: HCPCS

## 2023-03-24 RX ADMIN — TOLNAFTATE SCH: 10 CREAM TOPICAL at 22:39

## 2023-03-24 RX ADMIN — ATORVASTATIN CALCIUM SCH MG: 40 TABLET, FILM COATED ORAL at 22:38

## 2023-03-24 RX ADMIN — Medication SCH MG: at 22:38

## 2023-03-25 RX ADMIN — Medication PRN EACH: at 07:58

## 2023-03-25 RX ADMIN — SERTRALINE HYDROCHLORIDE SCH MG: 50 TABLET ORAL at 10:45

## 2023-03-25 RX ADMIN — TOLNAFTATE SCH: 10 CREAM TOPICAL at 21:25

## 2023-03-25 RX ADMIN — HYDROXYZINE PAMOATE PRN MG: 25 CAPSULE ORAL at 07:58

## 2023-03-25 RX ADMIN — LACTULOSE SCH GM: 20 SOLUTION ORAL at 14:35

## 2023-03-25 RX ADMIN — TOLNAFTATE SCH APPLIC: 10 CREAM TOPICAL at 10:47

## 2023-03-25 RX ADMIN — LACTULOSE SCH GM: 20 SOLUTION ORAL at 21:16

## 2023-03-25 RX ADMIN — Medication SCH MG: at 21:13

## 2023-03-25 RX ADMIN — ATORVASTATIN CALCIUM SCH MG: 40 TABLET, FILM COATED ORAL at 21:12

## 2023-03-25 RX ADMIN — Medication SCH TAB: at 10:40

## 2023-03-25 RX ADMIN — HYDROXYZINE PAMOATE PRN MG: 25 CAPSULE ORAL at 20:53

## 2023-03-25 RX ADMIN — ASPIRIN 81 MG SCH MG: 81 TABLET ORAL at 10:41

## 2023-03-26 VITALS
SYSTOLIC BLOOD PRESSURE: 151 MMHG | RESPIRATION RATE: 16 BRPM | TEMPERATURE: 97.7 F | HEART RATE: 89 BPM | DIASTOLIC BLOOD PRESSURE: 78 MMHG

## 2023-03-26 RX ADMIN — ASPIRIN 81 MG SCH MG: 81 TABLET ORAL at 10:29

## 2023-03-26 RX ADMIN — SERTRALINE HYDROCHLORIDE SCH MG: 50 TABLET ORAL at 10:29

## 2023-03-26 RX ADMIN — Medication PRN EACH: at 11:33

## 2023-03-26 RX ADMIN — LACTULOSE SCH: 20 SOLUTION ORAL at 05:45

## 2023-03-26 RX ADMIN — TOLNAFTATE SCH APPLIC: 10 CREAM TOPICAL at 10:29

## 2023-03-26 RX ADMIN — Medication SCH TAB: at 10:28

## 2023-03-26 RX ADMIN — LACTULOSE SCH GM: 20 SOLUTION ORAL at 13:39

## 2023-04-06 ENCOUNTER — HOSPITAL ENCOUNTER (INPATIENT)
Dept: HOSPITAL 74 - YASAS | Age: 68
LOS: 12 days | Discharge: HOME | DRG: 895 | End: 2023-04-18
Attending: PSYCHIATRY & NEUROLOGY | Admitting: ALLERGY & IMMUNOLOGY
Payer: COMMERCIAL

## 2023-04-06 VITALS — BODY MASS INDEX: 19.2 KG/M2

## 2023-04-06 DIAGNOSIS — F32.9: ICD-10-CM

## 2023-04-06 DIAGNOSIS — F10.20: Primary | ICD-10-CM

## 2023-04-06 DIAGNOSIS — R63.4: ICD-10-CM

## 2023-04-06 DIAGNOSIS — Z99.89: ICD-10-CM

## 2023-04-06 DIAGNOSIS — Z85.118: ICD-10-CM

## 2023-04-06 DIAGNOSIS — F17.210: ICD-10-CM

## 2023-04-06 DIAGNOSIS — I10: ICD-10-CM

## 2023-04-06 DIAGNOSIS — J45.20: ICD-10-CM

## 2023-04-06 DIAGNOSIS — R79.89: ICD-10-CM

## 2023-04-06 DIAGNOSIS — G40.909: ICD-10-CM

## 2023-04-06 DIAGNOSIS — E78.5: ICD-10-CM

## 2023-04-06 DIAGNOSIS — J43.9: ICD-10-CM

## 2023-04-06 DIAGNOSIS — F41.9: ICD-10-CM

## 2023-04-06 LAB
ALBUMIN SERPL-MCNC: 3.6 G/DL (ref 3.4–5)
ALP SERPL-CCNC: 98 U/L (ref 45–117)
ALT SERPL-CCNC: 30 U/L (ref 13–61)
ANION GAP SERPL CALC-SCNC: 8 MMOL/L (ref 8–16)
AST SERPL-CCNC: 34 U/L (ref 15–37)
BILIRUB SERPL-MCNC: 0.3 MG/DL (ref 0.2–1)
BUN SERPL-MCNC: 5.1 MG/DL (ref 7–18)
CALCIUM SERPL-MCNC: 9.7 MG/DL (ref 8.5–10.1)
CHLORIDE SERPL-SCNC: 96 MMOL/L (ref 98–107)
CO2 SERPL-SCNC: 27 MMOL/L (ref 21–32)
CREAT SERPL-MCNC: 0.7 MG/DL (ref 0.55–1.3)
DEPRECATED RDW RBC AUTO: 14.2 % (ref 11.6–15.6)
GLUCOSE SERPL-MCNC: 110 MG/DL (ref 74–106)
HCT VFR BLD CALC: 36.1 % (ref 32.4–45.2)
HGB BLD-MCNC: 12.6 GM/DL (ref 10.7–15.3)
MCH RBC QN AUTO: 31.2 PG (ref 25.7–33.7)
MCHC RBC AUTO-ENTMCNC: 34.7 G/DL (ref 32–36)
MCV RBC: 89.7 FL (ref 80–96)
PLATELET # BLD AUTO: 259 10^3/UL (ref 134–434)
PMV BLD: 8.5 FL (ref 7.5–11.1)
PROT SERPL-MCNC: 8.6 G/DL (ref 6.4–8.2)
RBC # BLD AUTO: 4.03 M/MM3 (ref 3.6–5.2)
SODIUM SERPL-SCNC: 131 MMOL/L (ref 136–145)
TREPONEMA PALLIDUM AB [UNITS/VOLUME] IN SERUM OR PLASMA BY IMMUNOASSAY: (no result)
WBC # BLD AUTO: 6.7 K/MM3 (ref 4–10)

## 2023-04-06 PROCEDURE — U0003 INFECTIOUS AGENT DETECTION BY NUCLEIC ACID (DNA OR RNA); SEVERE ACUTE RESPIRATORY SYNDROME CORONAVIRUS 2 (SARS-COV-2) (CORONAVIRUS DISEASE [COVID-19]), AMPLIFIED PROBE TECHNIQUE, MAKING USE OF HIGH THROUGHPUT TECHNOLOGIES AS DESCRIBED BY CMS-2020-01-R: HCPCS

## 2023-04-06 PROCEDURE — HZ42ZZZ GROUP COUNSELING FOR SUBSTANCE ABUSE TREATMENT, COGNITIVE-BEHAVIORAL: ICD-10-PCS | Performed by: PSYCHIATRY & NEUROLOGY

## 2023-04-06 PROCEDURE — U0005 INFEC AGEN DETEC AMPLI PROBE: HCPCS

## 2023-04-06 RX ADMIN — ATORVASTATIN CALCIUM SCH MG: 40 TABLET, FILM COATED ORAL at 21:32

## 2023-04-06 RX ADMIN — GABAPENTIN SCH MG: 100 CAPSULE ORAL at 21:32

## 2023-04-06 RX ADMIN — Medication SCH MG: at 21:32

## 2023-04-06 RX ADMIN — Medication SCH TAB: at 15:16

## 2023-04-06 RX ADMIN — BUDESONIDE AND FORMOTEROL FUMARATE DIHYDRATE SCH PUFF: 80; 4.5 AEROSOL RESPIRATORY (INHALATION) at 21:40

## 2023-04-07 RX ADMIN — GUAIFENESIN PRN MG: 600 TABLET, EXTENDED RELEASE ORAL at 23:32

## 2023-04-07 RX ADMIN — TIOTROPIUM BROMIDE INHALATION SPRAY SCH PUFF: 3.12 SPRAY, METERED RESPIRATORY (INHALATION) at 11:11

## 2023-04-07 RX ADMIN — Medication SCH TAB: at 10:05

## 2023-04-07 RX ADMIN — Medication SCH MG: at 21:49

## 2023-04-07 RX ADMIN — Medication PRN EACH: at 23:44

## 2023-04-07 RX ADMIN — GABAPENTIN SCH MG: 100 CAPSULE ORAL at 06:30

## 2023-04-07 RX ADMIN — GUAIFENESIN PRN MG: 600 TABLET, EXTENDED RELEASE ORAL at 10:26

## 2023-04-07 RX ADMIN — ALBUTEROL SULFATE PRN PUFF: 90 AEROSOL, METERED RESPIRATORY (INHALATION) at 21:49

## 2023-04-07 RX ADMIN — GABAPENTIN SCH: 100 CAPSULE ORAL at 14:08

## 2023-04-07 RX ADMIN — LATANOPROST SCH DROP: 50 SOLUTION OPHTHALMIC at 21:52

## 2023-04-07 RX ADMIN — ATORVASTATIN CALCIUM SCH MG: 40 TABLET, FILM COATED ORAL at 21:49

## 2023-04-07 RX ADMIN — LATANOPROST SCH: 50 SOLUTION OPHTHALMIC at 23:50

## 2023-04-07 RX ADMIN — LACTULOSE SCH GM: 20 SOLUTION ORAL at 21:49

## 2023-04-07 RX ADMIN — GABAPENTIN SCH MG: 100 CAPSULE ORAL at 21:49

## 2023-04-07 RX ADMIN — Medication PRN EACH: at 06:31

## 2023-04-07 RX ADMIN — SERTRALINE HYDROCHLORIDE SCH MG: 50 TABLET ORAL at 10:07

## 2023-04-07 RX ADMIN — ALBUTEROL SULFATE PRN PUFF: 90 AEROSOL, METERED RESPIRATORY (INHALATION) at 18:32

## 2023-04-07 RX ADMIN — ALBUTEROL SULFATE PRN PUFF: 90 AEROSOL, METERED RESPIRATORY (INHALATION) at 23:32

## 2023-04-07 RX ADMIN — BUDESONIDE AND FORMOTEROL FUMARATE DIHYDRATE SCH PUFF: 80; 4.5 AEROSOL RESPIRATORY (INHALATION) at 10:05

## 2023-04-07 RX ADMIN — BUDESONIDE AND FORMOTEROL FUMARATE DIHYDRATE SCH PUFF: 80; 4.5 AEROSOL RESPIRATORY (INHALATION) at 21:48

## 2023-04-07 RX ADMIN — ASPIRIN 81 MG SCH MG: 81 TABLET ORAL at 10:05

## 2023-04-08 RX ADMIN — SERTRALINE HYDROCHLORIDE SCH MG: 50 TABLET ORAL at 10:51

## 2023-04-08 RX ADMIN — Medication SCH MG: at 21:46

## 2023-04-08 RX ADMIN — GABAPENTIN SCH MG: 100 CAPSULE ORAL at 21:33

## 2023-04-08 RX ADMIN — HYDROXYZINE PAMOATE PRN MG: 25 CAPSULE ORAL at 06:30

## 2023-04-08 RX ADMIN — LATANOPROST SCH DROP: 50 SOLUTION OPHTHALMIC at 21:36

## 2023-04-08 RX ADMIN — HYDROXYZINE PAMOATE PRN MG: 25 CAPSULE ORAL at 21:33

## 2023-04-08 RX ADMIN — LACTULOSE SCH GM: 20 SOLUTION ORAL at 16:57

## 2023-04-08 RX ADMIN — GABAPENTIN SCH MG: 100 CAPSULE ORAL at 06:30

## 2023-04-08 RX ADMIN — ATORVASTATIN CALCIUM SCH MG: 40 TABLET, FILM COATED ORAL at 21:33

## 2023-04-08 RX ADMIN — BUDESONIDE AND FORMOTEROL FUMARATE DIHYDRATE SCH PUFF: 80; 4.5 AEROSOL RESPIRATORY (INHALATION) at 10:51

## 2023-04-08 RX ADMIN — LACTULOSE SCH GM: 20 SOLUTION ORAL at 21:32

## 2023-04-08 RX ADMIN — Medication SCH TAB: at 10:51

## 2023-04-08 RX ADMIN — GABAPENTIN SCH MG: 100 CAPSULE ORAL at 16:57

## 2023-04-08 RX ADMIN — LACTULOSE SCH GM: 20 SOLUTION ORAL at 06:30

## 2023-04-08 RX ADMIN — Medication PRN APPLIC: at 21:34

## 2023-04-08 RX ADMIN — Medication SCH MG: at 21:33

## 2023-04-08 RX ADMIN — BUDESONIDE AND FORMOTEROL FUMARATE DIHYDRATE SCH PUFF: 80; 4.5 AEROSOL RESPIRATORY (INHALATION) at 21:32

## 2023-04-08 RX ADMIN — TIOTROPIUM BROMIDE INHALATION SPRAY SCH: 3.12 SPRAY, METERED RESPIRATORY (INHALATION) at 10:52

## 2023-04-08 RX ADMIN — ASPIRIN 81 MG SCH MG: 81 TABLET ORAL at 10:50

## 2023-04-09 RX ADMIN — GABAPENTIN SCH: 100 CAPSULE ORAL at 15:04

## 2023-04-09 RX ADMIN — LATANOPROST SCH DROP: 50 SOLUTION OPHTHALMIC at 21:51

## 2023-04-09 RX ADMIN — GABAPENTIN SCH MG: 100 CAPSULE ORAL at 06:36

## 2023-04-09 RX ADMIN — SERTRALINE HYDROCHLORIDE SCH MG: 50 TABLET ORAL at 11:43

## 2023-04-09 RX ADMIN — Medication SCH TAB: at 11:43

## 2023-04-09 RX ADMIN — LACTULOSE SCH: 20 SOLUTION ORAL at 15:04

## 2023-04-09 RX ADMIN — BUDESONIDE AND FORMOTEROL FUMARATE DIHYDRATE SCH PUFF: 80; 4.5 AEROSOL RESPIRATORY (INHALATION) at 11:42

## 2023-04-09 RX ADMIN — GABAPENTIN SCH MG: 100 CAPSULE ORAL at 21:51

## 2023-04-09 RX ADMIN — Medication SCH MG: at 21:51

## 2023-04-09 RX ADMIN — LACTULOSE SCH: 20 SOLUTION ORAL at 22:10

## 2023-04-09 RX ADMIN — TIOTROPIUM BROMIDE INHALATION SPRAY SCH: 3.12 SPRAY, METERED RESPIRATORY (INHALATION) at 11:41

## 2023-04-09 RX ADMIN — ATORVASTATIN CALCIUM SCH MG: 40 TABLET, FILM COATED ORAL at 21:51

## 2023-04-09 RX ADMIN — BUDESONIDE AND FORMOTEROL FUMARATE DIHYDRATE SCH PUFF: 80; 4.5 AEROSOL RESPIRATORY (INHALATION) at 21:50

## 2023-04-09 RX ADMIN — ALBUTEROL SULFATE PRN PUFF: 90 AEROSOL, METERED RESPIRATORY (INHALATION) at 21:50

## 2023-04-09 RX ADMIN — LACTULOSE SCH: 20 SOLUTION ORAL at 06:37

## 2023-04-09 RX ADMIN — ASPIRIN 81 MG SCH MG: 81 TABLET ORAL at 11:43

## 2023-04-10 LAB
APPEARANCE UR: CLEAR
BILIRUB UR STRIP.AUTO-MCNC: NEGATIVE MG/DL
COLOR UR: YELLOW
KETONES UR QL STRIP: NEGATIVE
LEUKOCYTE ESTERASE UR QL STRIP.AUTO: NEGATIVE
NITRITE UR QL STRIP: NEGATIVE
PH UR: 7.5 [PH] (ref 5–8)
PROT UR QL STRIP: NEGATIVE
PROT UR QL STRIP: NEGATIVE
SP GR UR: 1.01 (ref 1.01–1.03)
UROBILINOGEN UR STRIP-MCNC: 0.2 MG/DL (ref 0.2–1)

## 2023-04-10 RX ADMIN — BUDESONIDE AND FORMOTEROL FUMARATE DIHYDRATE SCH PUFF: 80; 4.5 AEROSOL RESPIRATORY (INHALATION) at 21:40

## 2023-04-10 RX ADMIN — BUDESONIDE AND FORMOTEROL FUMARATE DIHYDRATE SCH PUFF: 80; 4.5 AEROSOL RESPIRATORY (INHALATION) at 10:18

## 2023-04-10 RX ADMIN — Medication SCH TAB: at 10:19

## 2023-04-10 RX ADMIN — GABAPENTIN SCH MG: 100 CAPSULE ORAL at 06:48

## 2023-04-10 RX ADMIN — LACTULOSE SCH: 20 SOLUTION ORAL at 06:52

## 2023-04-10 RX ADMIN — Medication SCH MG: at 21:39

## 2023-04-10 RX ADMIN — LATANOPROST SCH DROP: 50 SOLUTION OPHTHALMIC at 21:43

## 2023-04-10 RX ADMIN — GABAPENTIN SCH MG: 100 CAPSULE ORAL at 14:31

## 2023-04-10 RX ADMIN — SERTRALINE HYDROCHLORIDE SCH MG: 50 TABLET ORAL at 10:19

## 2023-04-10 RX ADMIN — ASPIRIN 81 MG SCH MG: 81 TABLET ORAL at 10:19

## 2023-04-10 RX ADMIN — LACTULOSE SCH: 20 SOLUTION ORAL at 14:28

## 2023-04-10 RX ADMIN — ATORVASTATIN CALCIUM SCH MG: 40 TABLET, FILM COATED ORAL at 21:39

## 2023-04-10 RX ADMIN — GABAPENTIN SCH MG: 100 CAPSULE ORAL at 21:40

## 2023-04-10 RX ADMIN — ALBUTEROL SULFATE PRN PUFF: 90 AEROSOL, METERED RESPIRATORY (INHALATION) at 23:48

## 2023-04-10 RX ADMIN — TIOTROPIUM BROMIDE INHALATION SPRAY SCH PUFF: 3.12 SPRAY, METERED RESPIRATORY (INHALATION) at 10:18

## 2023-04-10 RX ADMIN — HYDROXYZINE PAMOATE PRN MG: 25 CAPSULE ORAL at 06:49

## 2023-04-10 RX ADMIN — LACTULOSE SCH: 20 SOLUTION ORAL at 21:39

## 2023-04-11 RX ADMIN — TIOTROPIUM BROMIDE INHALATION SPRAY SCH: 3.12 SPRAY, METERED RESPIRATORY (INHALATION) at 10:03

## 2023-04-11 RX ADMIN — Medication SCH MG: at 21:41

## 2023-04-11 RX ADMIN — BUDESONIDE AND FORMOTEROL FUMARATE DIHYDRATE SCH PUFF: 80; 4.5 AEROSOL RESPIRATORY (INHALATION) at 21:42

## 2023-04-11 RX ADMIN — Medication SCH TAB: at 10:04

## 2023-04-11 RX ADMIN — Medication SCH MG: at 21:40

## 2023-04-11 RX ADMIN — LATANOPROST SCH DROP: 50 SOLUTION OPHTHALMIC at 21:43

## 2023-04-11 RX ADMIN — GABAPENTIN SCH MG: 100 CAPSULE ORAL at 06:26

## 2023-04-11 RX ADMIN — BUDESONIDE AND FORMOTEROL FUMARATE DIHYDRATE SCH PUFF: 80; 4.5 AEROSOL RESPIRATORY (INHALATION) at 10:04

## 2023-04-11 RX ADMIN — ATORVASTATIN CALCIUM SCH MG: 40 TABLET, FILM COATED ORAL at 21:40

## 2023-04-11 RX ADMIN — LACTULOSE SCH: 20 SOLUTION ORAL at 06:26

## 2023-04-11 RX ADMIN — GABAPENTIN SCH MG: 100 CAPSULE ORAL at 21:40

## 2023-04-11 RX ADMIN — HYDROXYZINE PAMOATE PRN MG: 25 CAPSULE ORAL at 01:34

## 2023-04-11 RX ADMIN — LACTULOSE SCH: 20 SOLUTION ORAL at 21:42

## 2023-04-11 RX ADMIN — ASPIRIN 81 MG SCH MG: 81 TABLET ORAL at 10:03

## 2023-04-11 RX ADMIN — LACTULOSE SCH: 20 SOLUTION ORAL at 13:31

## 2023-04-11 RX ADMIN — SERTRALINE HYDROCHLORIDE SCH MG: 50 TABLET ORAL at 10:03

## 2023-04-11 RX ADMIN — GABAPENTIN SCH MG: 100 CAPSULE ORAL at 15:00

## 2023-04-11 RX ADMIN — ALUMINUM HYDROXIDE, MAGNESIUM HYDROXIDE, AND SIMETHICONE PRN ML: 200; 200; 20 SUSPENSION ORAL at 13:30

## 2023-04-12 RX ADMIN — LACTULOSE SCH: 20 SOLUTION ORAL at 06:20

## 2023-04-12 RX ADMIN — ATORVASTATIN CALCIUM SCH MG: 40 TABLET, FILM COATED ORAL at 21:23

## 2023-04-12 RX ADMIN — LACTULOSE SCH: 20 SOLUTION ORAL at 14:46

## 2023-04-12 RX ADMIN — BUDESONIDE AND FORMOTEROL FUMARATE DIHYDRATE SCH: 80; 4.5 AEROSOL RESPIRATORY (INHALATION) at 21:21

## 2023-04-12 RX ADMIN — GABAPENTIN SCH MG: 100 CAPSULE ORAL at 14:46

## 2023-04-12 RX ADMIN — TIOTROPIUM BROMIDE INHALATION SPRAY SCH PUFF: 3.12 SPRAY, METERED RESPIRATORY (INHALATION) at 10:38

## 2023-04-12 RX ADMIN — Medication SCH MG: at 21:22

## 2023-04-12 RX ADMIN — LATANOPROST SCH DROP: 50 SOLUTION OPHTHALMIC at 21:25

## 2023-04-12 RX ADMIN — LACTULOSE SCH: 20 SOLUTION ORAL at 21:24

## 2023-04-12 RX ADMIN — ALBUTEROL SULFATE PRN PUFF: 90 AEROSOL, METERED RESPIRATORY (INHALATION) at 21:22

## 2023-04-12 RX ADMIN — GABAPENTIN SCH MG: 100 CAPSULE ORAL at 06:20

## 2023-04-12 RX ADMIN — SERTRALINE HYDROCHLORIDE SCH MG: 50 TABLET ORAL at 10:38

## 2023-04-12 RX ADMIN — BUDESONIDE AND FORMOTEROL FUMARATE DIHYDRATE SCH PUFF: 80; 4.5 AEROSOL RESPIRATORY (INHALATION) at 10:38

## 2023-04-12 RX ADMIN — GABAPENTIN SCH MG: 100 CAPSULE ORAL at 21:23

## 2023-04-12 RX ADMIN — Medication SCH MG: at 21:23

## 2023-04-12 RX ADMIN — Medication SCH TAB: at 10:37

## 2023-04-12 RX ADMIN — ASPIRIN 81 MG SCH MG: 81 TABLET ORAL at 10:38

## 2023-04-13 RX ADMIN — ASPIRIN 81 MG SCH MG: 81 TABLET ORAL at 10:05

## 2023-04-13 RX ADMIN — BUDESONIDE AND FORMOTEROL FUMARATE DIHYDRATE SCH PUFF: 80; 4.5 AEROSOL RESPIRATORY (INHALATION) at 10:07

## 2023-04-13 RX ADMIN — LACTULOSE SCH: 20 SOLUTION ORAL at 06:25

## 2023-04-13 RX ADMIN — TIOTROPIUM BROMIDE INHALATION SPRAY SCH PUFF: 3.12 SPRAY, METERED RESPIRATORY (INHALATION) at 10:07

## 2023-04-13 RX ADMIN — LACTULOSE SCH: 20 SOLUTION ORAL at 21:11

## 2023-04-13 RX ADMIN — BUDESONIDE AND FORMOTEROL FUMARATE DIHYDRATE SCH PUFF: 80; 4.5 AEROSOL RESPIRATORY (INHALATION) at 21:10

## 2023-04-13 RX ADMIN — GABAPENTIN SCH MG: 100 CAPSULE ORAL at 06:25

## 2023-04-13 RX ADMIN — SERTRALINE HYDROCHLORIDE SCH MG: 50 TABLET ORAL at 10:08

## 2023-04-13 RX ADMIN — HYDROXYZINE PAMOATE PRN MG: 25 CAPSULE ORAL at 21:12

## 2023-04-13 RX ADMIN — Medication SCH MG: at 21:11

## 2023-04-13 RX ADMIN — LACTULOSE SCH: 20 SOLUTION ORAL at 13:33

## 2023-04-13 RX ADMIN — LATANOPROST SCH DROP: 50 SOLUTION OPHTHALMIC at 21:10

## 2023-04-13 RX ADMIN — ATORVASTATIN CALCIUM SCH MG: 40 TABLET, FILM COATED ORAL at 21:11

## 2023-04-13 RX ADMIN — Medication SCH TAB: at 10:07

## 2023-04-13 RX ADMIN — GABAPENTIN SCH MG: 100 CAPSULE ORAL at 14:23

## 2023-04-13 RX ADMIN — Medication SCH MG: at 21:12

## 2023-04-13 RX ADMIN — GABAPENTIN SCH MG: 100 CAPSULE ORAL at 21:11

## 2023-04-14 RX ADMIN — LACTULOSE SCH: 20 SOLUTION ORAL at 21:17

## 2023-04-14 RX ADMIN — HYDROXYZINE PAMOATE PRN MG: 25 CAPSULE ORAL at 21:17

## 2023-04-14 RX ADMIN — TIOTROPIUM BROMIDE INHALATION SPRAY SCH: 3.12 SPRAY, METERED RESPIRATORY (INHALATION) at 11:00

## 2023-04-14 RX ADMIN — LACTULOSE SCH GM: 20 SOLUTION ORAL at 13:35

## 2023-04-14 RX ADMIN — Medication SCH TAB: at 10:55

## 2023-04-14 RX ADMIN — ASPIRIN 81 MG SCH MG: 81 TABLET ORAL at 11:34

## 2023-04-14 RX ADMIN — Medication SCH MG: at 21:17

## 2023-04-14 RX ADMIN — BUDESONIDE AND FORMOTEROL FUMARATE DIHYDRATE SCH PUFF: 80; 4.5 AEROSOL RESPIRATORY (INHALATION) at 21:15

## 2023-04-14 RX ADMIN — Medication SCH MG: at 21:16

## 2023-04-14 RX ADMIN — GABAPENTIN SCH MG: 100 CAPSULE ORAL at 06:53

## 2023-04-14 RX ADMIN — GABAPENTIN SCH MG: 100 CAPSULE ORAL at 13:35

## 2023-04-14 RX ADMIN — LACTULOSE SCH GM: 20 SOLUTION ORAL at 06:53

## 2023-04-14 RX ADMIN — LATANOPROST SCH DROP: 50 SOLUTION OPHTHALMIC at 21:16

## 2023-04-14 RX ADMIN — SERTRALINE HYDROCHLORIDE SCH MG: 50 TABLET ORAL at 10:56

## 2023-04-14 RX ADMIN — ATORVASTATIN CALCIUM SCH MG: 40 TABLET, FILM COATED ORAL at 21:16

## 2023-04-14 RX ADMIN — BUDESONIDE AND FORMOTEROL FUMARATE DIHYDRATE SCH PUFF: 80; 4.5 AEROSOL RESPIRATORY (INHALATION) at 10:57

## 2023-04-14 RX ADMIN — GABAPENTIN SCH MG: 100 CAPSULE ORAL at 21:16

## 2023-04-15 VITALS — RESPIRATION RATE: 18 BRPM

## 2023-04-15 RX ADMIN — SERTRALINE HYDROCHLORIDE SCH MG: 50 TABLET ORAL at 10:13

## 2023-04-15 RX ADMIN — ASPIRIN 81 MG SCH MG: 81 TABLET ORAL at 10:13

## 2023-04-15 RX ADMIN — Medication SCH TAB: at 10:12

## 2023-04-15 RX ADMIN — Medication SCH MG: at 21:43

## 2023-04-15 RX ADMIN — GABAPENTIN SCH MG: 100 CAPSULE ORAL at 21:43

## 2023-04-15 RX ADMIN — LACTULOSE SCH: 20 SOLUTION ORAL at 14:06

## 2023-04-15 RX ADMIN — LACTULOSE SCH: 20 SOLUTION ORAL at 06:33

## 2023-04-15 RX ADMIN — LATANOPROST SCH: 50 SOLUTION OPHTHALMIC at 21:45

## 2023-04-15 RX ADMIN — ATORVASTATIN CALCIUM SCH MG: 40 TABLET, FILM COATED ORAL at 21:42

## 2023-04-15 RX ADMIN — TIOTROPIUM BROMIDE INHALATION SPRAY SCH PUFF: 3.12 SPRAY, METERED RESPIRATORY (INHALATION) at 10:12

## 2023-04-15 RX ADMIN — GABAPENTIN SCH MG: 100 CAPSULE ORAL at 06:33

## 2023-04-15 RX ADMIN — BUDESONIDE AND FORMOTEROL FUMARATE DIHYDRATE SCH PUFF: 80; 4.5 AEROSOL RESPIRATORY (INHALATION) at 21:44

## 2023-04-15 RX ADMIN — BUDESONIDE AND FORMOTEROL FUMARATE DIHYDRATE SCH PUFF: 80; 4.5 AEROSOL RESPIRATORY (INHALATION) at 10:12

## 2023-04-15 RX ADMIN — GABAPENTIN SCH MG: 100 CAPSULE ORAL at 14:06

## 2023-04-15 RX ADMIN — LACTULOSE SCH: 20 SOLUTION ORAL at 21:44

## 2023-04-15 RX ADMIN — Medication PRN APPLIC: at 22:56

## 2023-04-16 RX ADMIN — Medication SCH TAB: at 10:24

## 2023-04-16 RX ADMIN — Medication SCH MG: at 21:29

## 2023-04-16 RX ADMIN — ATORVASTATIN CALCIUM SCH MG: 40 TABLET, FILM COATED ORAL at 21:29

## 2023-04-16 RX ADMIN — LACTULOSE SCH: 20 SOLUTION ORAL at 13:51

## 2023-04-16 RX ADMIN — GABAPENTIN SCH MG: 100 CAPSULE ORAL at 13:51

## 2023-04-16 RX ADMIN — GABAPENTIN SCH MG: 100 CAPSULE ORAL at 06:15

## 2023-04-16 RX ADMIN — BUDESONIDE AND FORMOTEROL FUMARATE DIHYDRATE SCH PUFF: 80; 4.5 AEROSOL RESPIRATORY (INHALATION) at 10:24

## 2023-04-16 RX ADMIN — Medication PRN EACH: at 16:33

## 2023-04-16 RX ADMIN — ASPIRIN 81 MG SCH MG: 81 TABLET ORAL at 10:24

## 2023-04-16 RX ADMIN — SERTRALINE HYDROCHLORIDE SCH MG: 50 TABLET ORAL at 10:24

## 2023-04-16 RX ADMIN — LACTULOSE SCH: 20 SOLUTION ORAL at 21:30

## 2023-04-16 RX ADMIN — ALUMINUM HYDROXIDE, MAGNESIUM HYDROXIDE, AND SIMETHICONE PRN ML: 200; 200; 20 SUSPENSION ORAL at 18:24

## 2023-04-16 RX ADMIN — LACTULOSE SCH: 20 SOLUTION ORAL at 06:15

## 2023-04-16 RX ADMIN — TIOTROPIUM BROMIDE INHALATION SPRAY SCH: 3.12 SPRAY, METERED RESPIRATORY (INHALATION) at 10:25

## 2023-04-16 RX ADMIN — GABAPENTIN SCH MG: 100 CAPSULE ORAL at 21:28

## 2023-04-16 RX ADMIN — LATANOPROST SCH DROP: 50 SOLUTION OPHTHALMIC at 21:31

## 2023-04-16 RX ADMIN — BUDESONIDE AND FORMOTEROL FUMARATE DIHYDRATE SCH PUFF: 80; 4.5 AEROSOL RESPIRATORY (INHALATION) at 21:30

## 2023-04-17 VITALS — TEMPERATURE: 97.6 F

## 2023-04-17 RX ADMIN — LACTULOSE SCH: 20 SOLUTION ORAL at 14:28

## 2023-04-17 RX ADMIN — HYDROXYZINE PAMOATE PRN MG: 25 CAPSULE ORAL at 21:24

## 2023-04-17 RX ADMIN — Medication SCH MG: at 21:22

## 2023-04-17 RX ADMIN — ASPIRIN 81 MG SCH MG: 81 TABLET ORAL at 10:19

## 2023-04-17 RX ADMIN — GABAPENTIN SCH MG: 100 CAPSULE ORAL at 14:28

## 2023-04-17 RX ADMIN — GABAPENTIN SCH MG: 100 CAPSULE ORAL at 06:11

## 2023-04-17 RX ADMIN — LACTULOSE SCH: 20 SOLUTION ORAL at 06:12

## 2023-04-17 RX ADMIN — GABAPENTIN SCH MG: 100 CAPSULE ORAL at 21:22

## 2023-04-17 RX ADMIN — BUDESONIDE AND FORMOTEROL FUMARATE DIHYDRATE SCH PUFF: 80; 4.5 AEROSOL RESPIRATORY (INHALATION) at 21:21

## 2023-04-17 RX ADMIN — LATANOPROST SCH DROP: 50 SOLUTION OPHTHALMIC at 21:23

## 2023-04-17 RX ADMIN — BUDESONIDE AND FORMOTEROL FUMARATE DIHYDRATE SCH PUFF: 80; 4.5 AEROSOL RESPIRATORY (INHALATION) at 10:20

## 2023-04-17 RX ADMIN — Medication SCH TAB: at 10:20

## 2023-04-17 RX ADMIN — TIOTROPIUM BROMIDE INHALATION SPRAY SCH PUFF: 3.12 SPRAY, METERED RESPIRATORY (INHALATION) at 10:20

## 2023-04-17 RX ADMIN — ATORVASTATIN CALCIUM SCH MG: 40 TABLET, FILM COATED ORAL at 21:22

## 2023-04-17 RX ADMIN — SERTRALINE HYDROCHLORIDE SCH MG: 50 TABLET ORAL at 10:21

## 2023-04-17 RX ADMIN — LACTULOSE SCH: 20 SOLUTION ORAL at 22:06

## 2023-04-18 VITALS — HEART RATE: 106 BPM | DIASTOLIC BLOOD PRESSURE: 64 MMHG | SYSTOLIC BLOOD PRESSURE: 125 MMHG

## 2023-04-18 RX ADMIN — ASPIRIN 81 MG SCH MG: 81 TABLET ORAL at 09:02

## 2023-04-18 RX ADMIN — SERTRALINE HYDROCHLORIDE SCH MG: 50 TABLET ORAL at 09:02

## 2023-04-18 RX ADMIN — GABAPENTIN SCH MG: 100 CAPSULE ORAL at 06:26

## 2023-04-18 RX ADMIN — LACTULOSE SCH: 20 SOLUTION ORAL at 06:26

## 2023-04-18 RX ADMIN — TIOTROPIUM BROMIDE INHALATION SPRAY SCH PUFF: 3.12 SPRAY, METERED RESPIRATORY (INHALATION) at 09:03

## 2023-04-18 RX ADMIN — BUDESONIDE AND FORMOTEROL FUMARATE DIHYDRATE SCH PUFF: 80; 4.5 AEROSOL RESPIRATORY (INHALATION) at 09:03

## 2023-04-18 RX ADMIN — Medication SCH TAB: at 09:02
